# Patient Record
Sex: MALE | Race: WHITE | NOT HISPANIC OR LATINO | Employment: OTHER | URBAN - METROPOLITAN AREA
[De-identification: names, ages, dates, MRNs, and addresses within clinical notes are randomized per-mention and may not be internally consistent; named-entity substitution may affect disease eponyms.]

---

## 2017-02-20 ENCOUNTER — COMMUNICATION - HEALTHEAST (OUTPATIENT)
Dept: FAMILY MEDICINE | Facility: CLINIC | Age: 72
End: 2017-02-20

## 2017-02-20 DIAGNOSIS — I10 ESSENTIAL HYPERTENSION: ICD-10-CM

## 2017-11-20 ENCOUNTER — AMBULATORY - HEALTHEAST (OUTPATIENT)
Dept: FAMILY MEDICINE | Facility: CLINIC | Age: 72
End: 2017-11-20

## 2017-11-20 DIAGNOSIS — I10 ESSENTIAL HYPERTENSION: ICD-10-CM

## 2017-11-20 DIAGNOSIS — E78.5 HYPERLIPIDEMIA: ICD-10-CM

## 2018-05-20 ENCOUNTER — COMMUNICATION - HEALTHEAST (OUTPATIENT)
Dept: FAMILY MEDICINE | Facility: CLINIC | Age: 73
End: 2018-05-20

## 2018-05-20 DIAGNOSIS — E78.5 HYPERLIPIDEMIA: ICD-10-CM

## 2018-05-20 DIAGNOSIS — I10 ESSENTIAL HYPERTENSION: ICD-10-CM

## 2018-08-19 ENCOUNTER — COMMUNICATION - HEALTHEAST (OUTPATIENT)
Dept: FAMILY MEDICINE | Facility: CLINIC | Age: 73
End: 2018-08-19

## 2018-08-19 DIAGNOSIS — E78.5 HYPERLIPIDEMIA: ICD-10-CM

## 2018-08-19 DIAGNOSIS — I10 ESSENTIAL HYPERTENSION: ICD-10-CM

## 2018-09-10 ENCOUNTER — RECORDS - HEALTHEAST (OUTPATIENT)
Dept: ADMINISTRATIVE | Facility: OTHER | Age: 73
End: 2018-09-10

## 2018-09-10 ENCOUNTER — OFFICE VISIT - HEALTHEAST (OUTPATIENT)
Dept: FAMILY MEDICINE | Facility: CLINIC | Age: 73
End: 2018-09-10

## 2018-09-10 DIAGNOSIS — Z00.00 MEDICARE ANNUAL WELLNESS VISIT, SUBSEQUENT: ICD-10-CM

## 2018-09-10 DIAGNOSIS — E78.5 HYPERLIPIDEMIA: ICD-10-CM

## 2018-09-10 DIAGNOSIS — I10 ESSENTIAL HYPERTENSION: ICD-10-CM

## 2018-09-10 DIAGNOSIS — Z12.11 SCREEN FOR COLON CANCER: ICD-10-CM

## 2018-09-10 LAB
ALBUMIN SERPL-MCNC: 4.1 G/DL (ref 3.5–5)
ALP SERPL-CCNC: 50 U/L (ref 45–120)
ALT SERPL W P-5'-P-CCNC: 15 U/L (ref 0–45)
ANION GAP SERPL CALCULATED.3IONS-SCNC: 8 MMOL/L (ref 5–18)
AST SERPL W P-5'-P-CCNC: 13 U/L (ref 0–40)
BILIRUB SERPL-MCNC: 1.1 MG/DL (ref 0–1)
BUN SERPL-MCNC: 11 MG/DL (ref 8–28)
CALCIUM SERPL-MCNC: 9.6 MG/DL (ref 8.5–10.5)
CHLORIDE BLD-SCNC: 106 MMOL/L (ref 98–107)
CHOLEST SERPL-MCNC: 155 MG/DL
CK SERPL-CCNC: 100 U/L (ref 30–190)
CO2 SERPL-SCNC: 28 MMOL/L (ref 22–31)
CREAT SERPL-MCNC: 0.96 MG/DL (ref 0.7–1.3)
ERYTHROCYTE [DISTWIDTH] IN BLOOD BY AUTOMATED COUNT: 11.2 % (ref 11–14.5)
FASTING STATUS PATIENT QL REPORTED: YES
GFR SERPL CREATININE-BSD FRML MDRD: >60 ML/MIN/1.73M2
GLUCOSE BLD-MCNC: 98 MG/DL (ref 70–125)
HCT VFR BLD AUTO: 45.5 % (ref 40–54)
HDLC SERPL-MCNC: 40 MG/DL
HGB BLD-MCNC: 15.3 G/DL (ref 14–18)
LDLC SERPL CALC-MCNC: 93 MG/DL
MCH RBC QN AUTO: 30.9 PG (ref 27–34)
MCHC RBC AUTO-ENTMCNC: 33.7 G/DL (ref 32–36)
MCV RBC AUTO: 92 FL (ref 80–100)
PLATELET # BLD AUTO: 194 THOU/UL (ref 140–440)
PMV BLD AUTO: 7.4 FL (ref 7–10)
POTASSIUM BLD-SCNC: 4.8 MMOL/L (ref 3.5–5)
PROT SERPL-MCNC: 7.8 G/DL (ref 6–8)
PSA SERPL-MCNC: 0.7 NG/ML (ref 0–6.5)
RBC # BLD AUTO: 4.96 MILL/UL (ref 4.4–6.2)
SODIUM SERPL-SCNC: 142 MMOL/L (ref 136–145)
TRIGL SERPL-MCNC: 111 MG/DL
TSH SERPL DL<=0.005 MIU/L-ACNC: 1.38 UIU/ML (ref 0.3–5)
WBC: 7.3 THOU/UL (ref 4–11)

## 2018-09-10 ASSESSMENT — MIFFLIN-ST. JEOR: SCORE: 1661.58

## 2018-09-17 ENCOUNTER — RECORDS - HEALTHEAST (OUTPATIENT)
Dept: ADMINISTRATIVE | Facility: OTHER | Age: 73
End: 2018-09-17

## 2018-10-16 ENCOUNTER — OFFICE VISIT - HEALTHEAST (OUTPATIENT)
Dept: FAMILY MEDICINE | Facility: CLINIC | Age: 73
End: 2018-10-16

## 2018-10-16 DIAGNOSIS — E78.5 HYPERLIPIDEMIA: ICD-10-CM

## 2018-10-16 DIAGNOSIS — Z01.818 PREOP GENERAL PHYSICAL EXAM: ICD-10-CM

## 2018-10-16 DIAGNOSIS — H26.9 BILATERAL CATARACTS: ICD-10-CM

## 2018-10-16 DIAGNOSIS — I10 ESSENTIAL HYPERTENSION: ICD-10-CM

## 2018-10-16 LAB
ATRIAL RATE - MUSE: 60 BPM
DIASTOLIC BLOOD PRESSURE - MUSE: NORMAL MMHG
INTERPRETATION ECG - MUSE: NORMAL
P AXIS - MUSE: 26 DEGREES
POTASSIUM BLD-SCNC: 4.8 MMOL/L (ref 3.5–5)
PR INTERVAL - MUSE: 148 MS
QRS DURATION - MUSE: 96 MS
QT - MUSE: 454 MS
QTC - MUSE: 503 MS
R AXIS - MUSE: 36 DEGREES
SYSTOLIC BLOOD PRESSURE - MUSE: NORMAL MMHG
T AXIS - MUSE: 46 DEGREES
VENTRICULAR RATE- MUSE: 74 BPM

## 2018-10-16 ASSESSMENT — MIFFLIN-ST. JEOR: SCORE: 1670.66

## 2018-11-08 ENCOUNTER — COMMUNICATION - HEALTHEAST (OUTPATIENT)
Dept: FAMILY MEDICINE | Facility: CLINIC | Age: 73
End: 2018-11-08

## 2019-03-26 ENCOUNTER — OFFICE VISIT - HEALTHEAST (OUTPATIENT)
Dept: FAMILY MEDICINE | Facility: CLINIC | Age: 74
End: 2019-03-26

## 2019-03-26 DIAGNOSIS — K40.90 RIGHT INGUINAL HERNIA: ICD-10-CM

## 2019-03-26 DIAGNOSIS — K59.00 CONSTIPATION, UNSPECIFIED CONSTIPATION TYPE: ICD-10-CM

## 2019-03-27 ENCOUNTER — OFFICE VISIT - HEALTHEAST (OUTPATIENT)
Dept: SURGERY | Facility: CLINIC | Age: 74
End: 2019-03-27

## 2019-03-27 DIAGNOSIS — K40.40: ICD-10-CM

## 2019-03-27 ASSESSMENT — MIFFLIN-ST. JEOR: SCORE: 1677.46

## 2019-04-15 ENCOUNTER — OFFICE VISIT - HEALTHEAST (OUTPATIENT)
Dept: FAMILY MEDICINE | Facility: CLINIC | Age: 74
End: 2019-04-15

## 2019-04-15 DIAGNOSIS — I10 ESSENTIAL HYPERTENSION: ICD-10-CM

## 2019-04-15 DIAGNOSIS — K40.90 RIGHT INGUINAL HERNIA: ICD-10-CM

## 2019-04-15 DIAGNOSIS — Z01.818 PREOP GENERAL PHYSICAL EXAM: ICD-10-CM

## 2019-04-15 DIAGNOSIS — E78.5 HYPERLIPIDEMIA, UNSPECIFIED HYPERLIPIDEMIA TYPE: ICD-10-CM

## 2019-04-15 LAB
ANION GAP SERPL CALCULATED.3IONS-SCNC: 8 MMOL/L (ref 5–18)
ATRIAL RATE - MUSE: 54 BPM
BUN SERPL-MCNC: 16 MG/DL (ref 8–28)
CALCIUM SERPL-MCNC: 9.6 MG/DL (ref 8.5–10.5)
CHLORIDE BLD-SCNC: 106 MMOL/L (ref 98–107)
CO2 SERPL-SCNC: 29 MMOL/L (ref 22–31)
CREAT SERPL-MCNC: 0.93 MG/DL (ref 0.7–1.3)
DIASTOLIC BLOOD PRESSURE - MUSE: NORMAL MMHG
ERYTHROCYTE [DISTWIDTH] IN BLOOD BY AUTOMATED COUNT: 11.1 % (ref 11–14.5)
GFR SERPL CREATININE-BSD FRML MDRD: >60 ML/MIN/1.73M2
GLUCOSE BLD-MCNC: 87 MG/DL (ref 70–125)
HCT VFR BLD AUTO: 46.7 % (ref 40–54)
HGB BLD-MCNC: 16.1 G/DL (ref 14–18)
INTERPRETATION ECG - MUSE: NORMAL
MCH RBC QN AUTO: 31.8 PG (ref 27–34)
MCHC RBC AUTO-ENTMCNC: 34.6 G/DL (ref 32–36)
MCV RBC AUTO: 92 FL (ref 80–100)
P AXIS - MUSE: 44 DEGREES
PLATELET # BLD AUTO: 205 THOU/UL (ref 140–440)
PMV BLD AUTO: 7.3 FL (ref 7–10)
POTASSIUM BLD-SCNC: 4.7 MMOL/L (ref 3.5–5)
PR INTERVAL - MUSE: 156 MS
QRS DURATION - MUSE: 98 MS
QT - MUSE: 474 MS
QTC - MUSE: 449 MS
R AXIS - MUSE: 50 DEGREES
RBC # BLD AUTO: 5.08 MILL/UL (ref 4.4–6.2)
SODIUM SERPL-SCNC: 143 MMOL/L (ref 136–145)
SYSTOLIC BLOOD PRESSURE - MUSE: NORMAL MMHG
T AXIS - MUSE: 59 DEGREES
VENTRICULAR RATE- MUSE: 54 BPM
WBC: 6.2 THOU/UL (ref 4–11)

## 2019-04-15 ASSESSMENT — MIFFLIN-ST. JEOR: SCORE: 1672.93

## 2019-04-23 ASSESSMENT — MIFFLIN-ST. JEOR: SCORE: 1654.78

## 2019-04-24 ENCOUNTER — ANESTHESIA - HEALTHEAST (OUTPATIENT)
Dept: SURGERY | Facility: AMBULATORY SURGERY CENTER | Age: 74
End: 2019-04-24

## 2019-04-25 ENCOUNTER — SURGERY - HEALTHEAST (OUTPATIENT)
Dept: SURGERY | Facility: AMBULATORY SURGERY CENTER | Age: 74
End: 2019-04-25

## 2019-09-15 ENCOUNTER — COMMUNICATION - HEALTHEAST (OUTPATIENT)
Dept: FAMILY MEDICINE | Facility: CLINIC | Age: 74
End: 2019-09-15

## 2019-09-15 DIAGNOSIS — E78.5 HYPERLIPIDEMIA: ICD-10-CM

## 2019-09-15 DIAGNOSIS — I10 ESSENTIAL HYPERTENSION: ICD-10-CM

## 2019-10-04 ENCOUNTER — OFFICE VISIT - HEALTHEAST (OUTPATIENT)
Dept: FAMILY MEDICINE | Facility: CLINIC | Age: 74
End: 2019-10-04

## 2019-10-04 DIAGNOSIS — Z00.00 MEDICARE ANNUAL WELLNESS VISIT, INITIAL: ICD-10-CM

## 2019-10-04 DIAGNOSIS — I10 ESSENTIAL HYPERTENSION: ICD-10-CM

## 2019-10-04 DIAGNOSIS — E78.5 HYPERLIPIDEMIA, UNSPECIFIED HYPERLIPIDEMIA TYPE: ICD-10-CM

## 2019-10-04 DIAGNOSIS — R35.1 NOCTURIA: ICD-10-CM

## 2019-10-04 LAB
ALBUMIN SERPL-MCNC: 4.2 G/DL (ref 3.5–5)
ALP SERPL-CCNC: 57 U/L (ref 45–120)
ALT SERPL W P-5'-P-CCNC: 11 U/L (ref 0–45)
ANION GAP SERPL CALCULATED.3IONS-SCNC: 7 MMOL/L (ref 5–18)
AST SERPL W P-5'-P-CCNC: 12 U/L (ref 0–40)
BILIRUB SERPL-MCNC: 0.7 MG/DL (ref 0–1)
BUN SERPL-MCNC: 15 MG/DL (ref 8–28)
CALCIUM SERPL-MCNC: 9.6 MG/DL (ref 8.5–10.5)
CHLORIDE BLD-SCNC: 104 MMOL/L (ref 98–107)
CHOLEST SERPL-MCNC: 157 MG/DL
CO2 SERPL-SCNC: 30 MMOL/L (ref 22–31)
CREAT SERPL-MCNC: 1.07 MG/DL (ref 0.7–1.3)
ERYTHROCYTE [DISTWIDTH] IN BLOOD BY AUTOMATED COUNT: 11.3 % (ref 11–14.5)
FASTING STATUS PATIENT QL REPORTED: YES
GFR SERPL CREATININE-BSD FRML MDRD: >60 ML/MIN/1.73M2
GLUCOSE BLD-MCNC: 102 MG/DL (ref 70–125)
HCT VFR BLD AUTO: 44.1 % (ref 40–54)
HDLC SERPL-MCNC: 38 MG/DL
HGB BLD-MCNC: 15 G/DL (ref 14–18)
LDLC SERPL CALC-MCNC: 85 MG/DL
MCH RBC QN AUTO: 30.8 PG (ref 27–34)
MCHC RBC AUTO-ENTMCNC: 34 G/DL (ref 32–36)
MCV RBC AUTO: 90 FL (ref 80–100)
PLATELET # BLD AUTO: 207 THOU/UL (ref 140–440)
PMV BLD AUTO: 7.2 FL (ref 7–10)
POTASSIUM BLD-SCNC: 4.3 MMOL/L (ref 3.5–5)
PROT SERPL-MCNC: 7.5 G/DL (ref 6–8)
PSA SERPL-MCNC: 0.5 NG/ML (ref 0–6.5)
RBC # BLD AUTO: 4.88 MILL/UL (ref 4.4–6.2)
SODIUM SERPL-SCNC: 141 MMOL/L (ref 136–145)
TRIGL SERPL-MCNC: 170 MG/DL
WBC: 6.6 THOU/UL (ref 4–11)

## 2019-10-04 ASSESSMENT — MIFFLIN-ST. JEOR: SCORE: 1666.12

## 2019-10-07 ENCOUNTER — COMMUNICATION - HEALTHEAST (OUTPATIENT)
Dept: FAMILY MEDICINE | Facility: CLINIC | Age: 74
End: 2019-10-07

## 2019-10-07 LAB — HCV AB SERPL QL IA: NEGATIVE

## 2020-03-09 ENCOUNTER — COMMUNICATION - HEALTHEAST (OUTPATIENT)
Dept: FAMILY MEDICINE | Facility: CLINIC | Age: 75
End: 2020-03-09

## 2020-03-09 DIAGNOSIS — E78.5 HYPERLIPIDEMIA: ICD-10-CM

## 2020-03-09 DIAGNOSIS — I10 ESSENTIAL HYPERTENSION: ICD-10-CM

## 2020-06-11 ENCOUNTER — COMMUNICATION - HEALTHEAST (OUTPATIENT)
Dept: FAMILY MEDICINE | Facility: CLINIC | Age: 75
End: 2020-06-11

## 2020-06-11 DIAGNOSIS — I10 ESSENTIAL HYPERTENSION: ICD-10-CM

## 2020-06-11 DIAGNOSIS — E78.5 HYPERLIPIDEMIA: ICD-10-CM

## 2020-08-18 ENCOUNTER — COMMUNICATION - HEALTHEAST (OUTPATIENT)
Dept: FAMILY MEDICINE | Facility: CLINIC | Age: 75
End: 2020-08-18

## 2020-09-16 ENCOUNTER — OFFICE VISIT - HEALTHEAST (OUTPATIENT)
Dept: FAMILY MEDICINE | Facility: CLINIC | Age: 75
End: 2020-09-16

## 2020-09-16 DIAGNOSIS — I10 ESSENTIAL HYPERTENSION: ICD-10-CM

## 2020-09-16 DIAGNOSIS — E78.5 HYPERLIPIDEMIA: ICD-10-CM

## 2020-09-16 DIAGNOSIS — Z00.00 HEALTHCARE MAINTENANCE: ICD-10-CM

## 2020-09-16 LAB
ALT SERPL W P-5'-P-CCNC: 16 U/L (ref 0–45)
ANION GAP SERPL CALCULATED.3IONS-SCNC: 8 MMOL/L (ref 5–18)
BUN SERPL-MCNC: 13 MG/DL (ref 8–28)
CALCIUM SERPL-MCNC: 9.7 MG/DL (ref 8.5–10.5)
CHLORIDE BLD-SCNC: 103 MMOL/L (ref 98–107)
CHOLEST SERPL-MCNC: 167 MG/DL
CO2 SERPL-SCNC: 29 MMOL/L (ref 22–31)
CREAT SERPL-MCNC: 1.13 MG/DL (ref 0.7–1.3)
FASTING STATUS PATIENT QL REPORTED: YES
GFR SERPL CREATININE-BSD FRML MDRD: >60 ML/MIN/1.73M2
GLUCOSE BLD-MCNC: 102 MG/DL (ref 70–125)
HDLC SERPL-MCNC: 43 MG/DL
LDLC SERPL CALC-MCNC: 104 MG/DL
POTASSIUM BLD-SCNC: 5.4 MMOL/L (ref 3.5–5)
SODIUM SERPL-SCNC: 140 MMOL/L (ref 136–145)
TRIGL SERPL-MCNC: 98 MG/DL

## 2020-09-16 ASSESSMENT — MIFFLIN-ST. JEOR: SCORE: 1666.12

## 2020-09-16 NOTE — ASSESSMENT & PLAN NOTE
Hypertension is well controlled; 128/80 on second reading  BP Readings from Last 3 Encounters:   09/16/20 128/80   10/04/19 120/80   04/25/19 140/76       Current antihypertensives Lotrel 10/20, metoprolol XL 50  Plan: No change, check BMP  Follow-up: 1 year  Results for orders placed or performed in visit on 04/15/19   Basic Metabolic Panel   Result Value Ref Range    Sodium 143 136 - 145 mmol/L    Potassium 4.7 3.5 - 5.0 mmol/L    Chloride 106 98 - 107 mmol/L    CO2 29 22 - 31 mmol/L    Anion Gap, Calculation 8 5 - 18 mmol/L    Glucose 87 70 - 125 mg/dL    Calcium 9.6 8.5 - 10.5 mg/dL    BUN 16 8 - 28 mg/dL    Creatinine 0.93 0.70 - 1.30 mg/dL    GFR MDRD Af Amer >60 >60 mL/min/1.73m2    GFR MDRD Non Af Amer >60 >60 mL/min/1.73m2

## 2020-09-16 NOTE — ASSESSMENT & PLAN NOTE
Healthcare maintenance assessment  Immunization-declines all immunizations, strongly encouraged flu shot  Cancer screening-Cologuashaina 2018, due next year but will be 76 years old, PSA every 2 years, done last year  Vascular-blood pressure good, cholesterol good, regular exercise  Other-has copy of advance directives, briefly reviewed it with him.

## 2020-09-16 NOTE — ASSESSMENT & PLAN NOTE
Simvastatin 20, check lipid and ALT today.  Discussed possibility of stopping lipid-lowering medication around 75 for primary prevention, asked him to speak with his PCP next year and discuss pros and cons.

## 2020-09-17 ENCOUNTER — COMMUNICATION - HEALTHEAST (OUTPATIENT)
Dept: FAMILY MEDICINE | Facility: CLINIC | Age: 75
End: 2020-09-17

## 2021-03-10 ENCOUNTER — AMBULATORY - HEALTHEAST (OUTPATIENT)
Dept: NURSING | Facility: CLINIC | Age: 76
End: 2021-03-10

## 2021-03-31 ENCOUNTER — AMBULATORY - HEALTHEAST (OUTPATIENT)
Dept: NURSING | Facility: CLINIC | Age: 76
End: 2021-03-31

## 2021-05-27 NOTE — PROGRESS NOTES
I was asked to consult on this pt by Ayo Daniels MD for evaluation a hernia.    HPI:  This is a 74 y.o. male here today with concerns of pain and bulging in his right groin. He has noted this for the past 4 week(s). The discomfort he is experiencing is a deep gnawing pain that is worse toward the end of the day.  He notes that the swelling goes away when he lays down.      Allergies:Patient has no known allergies.    No past medical history on file.    No past surgical history on file.   Cateracts Procedure    CURRENT MEDS:  Current Outpatient Medications   Medication Sig Dispense Refill     amLODIPine-benazepril (LOTREL) 10-20 mg per capsule Take 1 capsule by mouth daily. 90 capsule 3     aspirin 81 mg chewable tablet Chew 81 mg daily.       COD LIVER OIL ORAL Take 1 tablet by mouth daily.       metoprolol succinate (TOPROL-XL) 50 MG 24 hr tablet Take 1 tablet (50 mg total) by mouth daily. 90 tablet 3     multivitamin with minerals tablet Take 1 tablet by mouth daily.       simvastatin (ZOCOR) 20 MG tablet Take 1 tablet (20 mg total) by mouth at bedtime. 90 tablet 3     No current facility-administered medications for this visit.        No family history on file.     reports that he has quit smoking. he has never used smokeless tobacco. He reports that he drinks alcohol.    Review of Systems -   10 point Review of systems is negative except for; as mentioned above in HPI and PMHx    There were no vitals taken for this visit.  There is no height or weight on file to calculate BMI.    EXAM:  GENERAL: Well developed male  HEENT: EOMI, Anicteric Sclera  NECK:  No masses, good flexion and extention of the neck  CARDIAC: RRR w/out murmur   CHEST/LUNG: Clear  ABDOMEN: right inguinal hernia. I do not feel a hernia on the Left.  GENITAL: Both testicles descended without masses  NEURO: He is ambulatory with good strength in both legs.    IMAGES: none    Assessment/Plan: Pt with a right inguinal hernia. I discussed this  at length with him.  I went over conservative management as well as surgical treatment for hernias.   I would reccomend a laparoscopic inguinal hernia repair, understanding the possibility of converting to an open operation.   I went over the small risks of surgery including but not limited to bleeding and infection. I discussed the expected recovery time as well. We will schedule this hernia repair at his earliest convenience.    Brendan Mtz MD  [facility]  [phone]

## 2021-05-27 NOTE — PROGRESS NOTES
Preoperative Exam    Scheduled Procedure: Inguinal hernia repair  Surgery Date:  4/25/19  Surgery Location: [facility], fax [phone]    Surgeon:  Dr. Mtz    Assessment/Plan:     1. Preop general physical exam  Electrocardiogram Perform - Clinic    HM2(CBC w/o Differential)   2. Right inguinal hernia     3. Hypertension  Basic Metabolic Panel   4. Hyperlipidemia, unspecified hyperlipidemia type         Surgical Procedure Risk: Low (reported cardiac risk generally < 1%)  Have you had prior anesthesia?: Yes  Have you or any family members had a previous anesthesia reaction:  No  Do you or any family members have a history of a clotting or bleeding disorder?: No  Cardiac Risk Assessment: no increased risk for major cardiac complications    Patient approved for surgery with general or local anesthesia.        Functional Status: Independent  Patient plans to recover at home with a friend     Subjective:      Dwayne Shelton is a 74 y.o. male who presents for a preoperative consultation.  Patient has a 2-3-month history of low bulge and discomfort in the right groin found to have an inguinal hernia.    Plan for laparoscopic surgery.    Patient's past surgeries have been unremarkable only cataract and YAG laser.    He does have hypertension which is controlled.  He will stop his aspirin 5 days prior to the procedure.    No additional concerns or issues    10 point review of systems reviewed and are negative, other than those listed in the HPI.    Pertinent History  Do you have difficulty breathing or chest pain after walking up a flight of stairs: No  History of obstructive sleep apnea: No  Steroid use in the last 6 months: No  Frequent Aspirin/NSAID use: Yes: Pt uses aspirin 81 mg daily, will hold 5 days prior to the procedure  Prior Blood Transfusion: No  Prior Blood Transfusion Reaction: No  If for some reason prior to, during or after the procedure, if it is medically indicated, would you be  willing to have a blood transfusion?:  There is no transfusion refusal.    Current Outpatient Medications   Medication Sig Dispense Refill     amLODIPine-benazepril (LOTREL) 10-20 mg per capsule Take 1 capsule by mouth daily. 90 capsule 3     metoprolol succinate (TOPROL-XL) 50 MG 24 hr tablet Take 1 tablet (50 mg total) by mouth daily. 90 tablet 3     simvastatin (ZOCOR) 20 MG tablet Take 1 tablet (20 mg total) by mouth at bedtime. 90 tablet 3     aspirin 81 mg chewable tablet Chew 81 mg daily.       COD LIVER OIL ORAL Take 1 tablet by mouth daily.       multivitamin with minerals tablet Take 1 tablet by mouth daily.       No current facility-administered medications for this visit.         No Known Allergies    Patient Active Problem List   Diagnosis     Hypertension     Hyperlipidemia     Cerumen Impaction     Hernia, inguinal       Past surgical history: Cataract bilaterally also YAG laser bilaterally.    No family or personal history of anesthesia or bleeding problems.          Social History     Socioeconomic History     Marital status: Single     Spouse name: Not on file     Number of children: Not on file     Years of education: Not on file     Highest education level: Not on file   Occupational History     Not on file   Social Needs     Financial resource strain: Not on file     Food insecurity:     Worry: Not on file     Inability: Not on file     Transportation needs:     Medical: Not on file     Non-medical: Not on file   Tobacco Use     Smoking status: Former Smoker     Smokeless tobacco: Never Used   Substance and Sexual Activity     Alcohol use: Yes     Comment: once per week     Drug use: Not on file     Sexual activity: Not on file   Lifestyle     Physical activity:     Days per week: Not on file     Minutes per session: Not on file     Stress: Not on file   Relationships     Social connections:     Talks on phone: Not on file     Gets together: Not on file     Attends Druze service: Not on file  "    Active member of club or organization: Not on file     Attends meetings of clubs or organizations: Not on file     Relationship status: Not on file     Intimate partner violence:     Fear of current or ex partner: Not on file     Emotionally abused: Not on file     Physically abused: Not on file     Forced sexual activity: Not on file   Other Topics Concern     Not on file   Social History Narrative     Not on file             Objective:     Vitals:    04/15/19 0904   BP: 134/82   Pulse: (!) 52   Resp: 12   SpO2: 96%   Weight: 189 lb (85.7 kg)   Height: 6' 3\" (1.905 m)         Physical Exam:  General appearance no acute distress    HEENT neck is negative oropharynx is clear pupils react normally extraocular movements full he has bilateral intraocular lenses.    Lungs were clear no rales or rhonchi heart was regular S1-S2 rate 55-60.    Heart was regular S1-S2    O2 sat look good at 96%    Abdomen soft bowel sounds normal no guarding or rebound    Inguinal hernia on the right, left is normal    Extremities without edema pulses are full.    No rashes in through the skin.    EKG: EKG reviewed and agree with reading below, no acute changes, QT interval has shortened  Normal sinus rhythm      Labs: BMP is pending and will be faxed, normal CBC      Recent Results (from the past 24 hour(s))   HM2(CBC w/o Differential)    Collection Time: 04/15/19  9:19 AM   Result Value Ref Range    WBC 6.2 4.0 - 11.0 thou/uL    RBC 5.08 4.40 - 6.20 mill/uL    Hemoglobin 16.1 14.0 - 18.0 g/dL    Hematocrit 46.7 40.0 - 54.0 %    MCV 92 80 - 100 fL    MCH 31.8 27.0 - 34.0 pg    MCHC 34.6 32.0 - 36.0 g/dL    RDW 11.1 11.0 - 14.5 %    Platelets 205 140 - 440 thou/uL    MPV 7.3 7.0 - 10.0 fL   Electrocardiogram Perform - Clinic    Collection Time: 04/15/19  9:46 AM   Result Value Ref Range    SYSTOLIC BLOOD PRESSURE  mmHg    DIASTOLIC BLOOD PRESSURE  mmHg    VENTRICULAR RATE 54 BPM    ATRIAL RATE 54 BPM    P-R INTERVAL 156 ms    QRS " DURATION 98 ms    Q-T INTERVAL 474 ms    QTC CALCULATION (BEZET) 449 ms    P Axis 44 degrees    R AXIS 50 degrees    T AXIS 59 degrees    MUSE DIAGNOSIS       Sinus bradycardia  Otherwise normal ECG  When compared with ECG of 16-OCT-2018 10:56,  Premature ventricular complexes are no longer Present  QT has shortened           There is no immunization history on file for this patient.        Electronically signed by Ayo Daniels MD 04/15/19 9:06 AM

## 2021-05-27 NOTE — PROGRESS NOTES
Hernia education & surgery packet provided to pt.    Sheyla De La Fuente CMA ([facility])

## 2021-05-27 NOTE — PROGRESS NOTES
Subjective: Patient comes in for evaluation this 74-year-old has discomfort in through the right inguinal area he has some discomfort especially when he is constipated.  He is been straining more    Denies any specific injury but has noticed a bulge there.    He did not want any immunizations updated    He continues on his blood pressure medicine and that looks good.  Takes amlodipine benazepril as well as metoprolol.    Tobacco status: He  reports that he has quit smoking. he has never used smokeless tobacco.    Patient Active Problem List    Diagnosis Date Noted     Hernia, inguinal 03/28/2019     Hypertension      Hyperlipidemia      Cerumen Impaction        Current Outpatient Medications   Medication Sig Dispense Refill     amLODIPine-benazepril (LOTREL) 10-20 mg per capsule Take 1 capsule by mouth daily. 90 capsule 3     aspirin 81 mg chewable tablet Chew 81 mg daily.       COD LIVER OIL ORAL Take 1 tablet by mouth daily.       metoprolol succinate (TOPROL-XL) 50 MG 24 hr tablet Take 1 tablet (50 mg total) by mouth daily. 90 tablet 3     multivitamin with minerals tablet Take 1 tablet by mouth daily.       simvastatin (ZOCOR) 20 MG tablet Take 1 tablet (20 mg total) by mouth at bedtime. 90 tablet 3     No current facility-administered medications for this visit.        ROS: 10 point review of systems positive as outlined otherwise negative    Objective:    /76 (Patient Site: Left Arm, Patient Position: Sitting, Cuff Size: Adult Regular)   Pulse 64   Resp 12   Wt 190 lb (86.2 kg)   BMI 24.39 kg/m    Body mass index is 24.39 kg/m .      General appearance no acute distress    Lungs are clear no rales or rhonchi heart regular S1-S2 rate in the 60s    Abdomen is soft bowel sounds normal no guarding or rebound    Genital exam showed a right inguinal hernia, testes otherwise unremarkable    Extremities without edema.        Assessment:  1. Right inguinal hernia  Ambulatory referral to General Surgery   2.  Constipation, unspecified constipation type       Right inguinal hernia see general surgery    Constipation increase fruits vegetables water and also discussed using MiraLAX as needed    Plan:.  He is in favor of getting this repaired, await consultation    This transcription uses voice recognition software, which may contain typographical errors.

## 2021-05-28 NOTE — ANESTHESIA CARE TRANSFER NOTE
Last vitals:   Vitals:    04/25/19 1423   BP: (!) 170/99   Pulse: (!) 109   Resp: 16   Temp: 36.5  C (97.7  F)   SpO2: 96%     Pt brought to PACU on 10L facemask. Monitors applied. VSS upon arrival.    Patient's level of consciousness is drowsy  Spontaneous respirations: yes  Maintains airway independently: yes  Dentition unchanged: yes  Oropharynx: oropharynx clear of all foreign objects    QCDR Measures:  ASA# 20 - Surgical Safety Checklist: WHO surgical safety checklist completed prior to induction    PQRS# 430 - Adult PONV Prevention: 4558F - Pt received => 2 anti-emetic agents (different classes) preop & intraop  ASA# 8 - Peds PONV Prevention: NA - Not pediatric patient, not GA or 2 or more risk factors NOT present  PQRS# 424 - Keshia-op Temp Management: 4559F - At least one body temp DOCUMENTED => 35.5C or 95.9F within required timeframe  PQRS# 426 - PACU Transfer Protocol: - Transfer of care checklist used  ASA# 14 - Acute Post-op Pain: ASA14B - Patient did NOT experience pain >= 7 out of 10

## 2021-05-28 NOTE — ANESTHESIA POSTPROCEDURE EVALUATION
Patient: Dwayne Shelton  REPAIR, HERNIA, INGUINAL, LAPAROSCOPIC  Anesthesia type: general    Patient location: Phase II Recovery  Last vitals:   Vitals Value Taken Time   /79 4/25/2019  3:30 PM   Temp 36.5  C (97.7  F) 4/25/2019  3:04 PM   Pulse 77 4/25/2019  3:38 PM   Resp 16 4/25/2019  3:04 PM   SpO2 93 % 4/25/2019  3:38 PM   Vitals shown include unvalidated device data.  Post vital signs: stable  Level of consciousness: awake and responds to simple questions  Post-anesthesia pain: pain controlled  Post-anesthesia nausea and vomiting: no  Pulmonary: unassisted, return to baseline  Cardiovascular: stable and blood pressure at baseline  Hydration: adequate  Anesthetic events: no    QCDR Measures:  ASA# 11 - Keshia-op Cardiac Arrest: ASA11B - Patient did NOT experience unanticipated cardiac arrest  ASA# 12 - Keshia-op Mortality Rate: ASA12B - Patient did NOT die  ASA# 13 - PACU Re-Intubation Rate: ASA13B - Patient did NOT require a new airway mgmt  ASA# 10 - Composite Anes Safety: ASA10A - No serious adverse event    Additional Notes:

## 2021-05-31 ENCOUNTER — RECORDS - HEALTHEAST (OUTPATIENT)
Dept: ADMINISTRATIVE | Facility: CLINIC | Age: 76
End: 2021-05-31

## 2021-06-01 VITALS — HEIGHT: 74 IN | BODY MASS INDEX: 24.38 KG/M2 | WEIGHT: 190 LBS

## 2021-06-01 VITALS — WEIGHT: 192 LBS | BODY MASS INDEX: 24.64 KG/M2 | HEIGHT: 74 IN

## 2021-06-01 NOTE — TELEPHONE ENCOUNTER
Refill Approved    Rx renewed per Medication Renewal Policy. Medication was last renewed on 9/10/18, last OV 4/15/19.    Lili Donato, [facility] Triage/Med Refill 9/15/2019     Requested Prescriptions   Pending Prescriptions Disp Refills     simvastatin (ZOCOR) 20 MG tablet [Pharmacy Med Name: Simvastatin Oral Tablet 20 MG] 90 tablet 2     Sig: Take 1 tablet (20 mg total) by mouth at bedtime.       Statins Refill Protocol (Hmg CoA Reductase Inhibitors) Passed - 9/15/2019  9:30 AM        Passed - PCP or prescribing provider visit in past 12 months      Last office visit with prescriber/PCP: 3/26/2019 Ayo Daniels MD OR same dept: 3/26/2019 Ayo Daniels MD OR same specialty: 3/26/2019 Ayo Daniels MD  Last physical: 4/15/2019 Last MTM visit: Visit date not found   Next visit within 3 mo: Visit date not found  Next physical within 3 mo: Visit date not found  Prescriber OR PCP: Ayo Daniels MD  Last diagnosis associated with med order: 1. Hyperlipidemia  - simvastatin (ZOCOR) 20 MG tablet [Pharmacy Med Name: Simvastatin Oral Tablet 20 MG]; Take 1 tablet (20 mg total) by mouth at bedtime.  Dispense: 90 tablet; Refill: 2    2. Essential hypertension  - metoprolol succinate (TOPROL-XL) 50 MG 24 hr tablet [Pharmacy Med Name: Metoprolol Succinate ER Oral Tablet Extended Release 24 Hour 50 MG]; Take 1 tablet (50 mg total) by mouth daily.  Dispense: 90 tablet; Refill: 2  - amLODIPine-benazepril (LOTREL) 10-20 mg per capsule [Pharmacy Med Name: amLODIPine Besy-Benazepril HCl Oral Capsule 10-20 MG]; Take 1 capsule by mouth daily.  Dispense: 90 capsule; Refill: 2    If protocol passes may refill for 12 months if within 3 months of last provider visit (or a total of 15 months).             metoprolol succinate (TOPROL-XL) 50 MG 24 hr tablet [Pharmacy Med Name: Metoprolol Succinate ER Oral Tablet Extended Release 24 Hour 50 MG] 90 tablet 2     Sig: Take 1 tablet (50 mg total) by mouth daily.        Beta-Blockers Refill Protocol Passed - 9/15/2019  9:30 AM        Passed - PCP or prescribing provider visit in past 12 months or next 3 months     Last office visit with prescriber/PCP: 3/26/2019 Ayo Daniels MD OR same dept: 3/26/2019 Ayo Daniels MD OR same specialty: 3/26/2019 Ayo Daniels MD  Last physical: 4/15/2019 Last MTM visit: Visit date not found   Next visit within 3 mo: Visit date not found  Next physical within 3 mo: Visit date not found  Prescriber OR PCP: Ayo Daniels MD  Last diagnosis associated with med order: 1. Hyperlipidemia  - simvastatin (ZOCOR) 20 MG tablet [Pharmacy Med Name: Simvastatin Oral Tablet 20 MG]; Take 1 tablet (20 mg total) by mouth at bedtime.  Dispense: 90 tablet; Refill: 2    2. Essential hypertension  - metoprolol succinate (TOPROL-XL) 50 MG 24 hr tablet [Pharmacy Med Name: Metoprolol Succinate ER Oral Tablet Extended Release 24 Hour 50 MG]; Take 1 tablet (50 mg total) by mouth daily.  Dispense: 90 tablet; Refill: 2  - amLODIPine-benazepril (LOTREL) 10-20 mg per capsule [Pharmacy Med Name: amLODIPine Besy-Benazepril HCl Oral Capsule 10-20 MG]; Take 1 capsule by mouth daily.  Dispense: 90 capsule; Refill: 2    If protocol passes may refill for 12 months if within 3 months of last provider visit (or a total of 15 months).             Passed - Blood pressure filed in past 12 months     BP Readings from Last 1 Encounters:   04/25/19 140/76             amLODIPine-benazepril (LOTREL) 10-20 mg per capsule [Pharmacy Med Name: amLODIPine Besy-Benazepril HCl Oral Capsule 10-20 MG] 90 capsule 2     Sig: Take 1 capsule by mouth daily.       Ace Inhibitors Refill Protocol Passed - 9/15/2019  9:30 AM        Passed - PCP or prescribing provider visit in past 12 months       Last office visit with prescriber/PCP: 3/26/2019 Ayo Daniels MD OR same dept: 3/26/2019 Ayo Daniels MD OR same specialty: 3/26/2019 Ayo Daniels MD  Last physical: 4/15/2019 Last MTM  visit: Visit date not found   Next visit within 3 mo: Visit date not found  Next physical within 3 mo: Visit date not found  Prescriber OR PCP: Ayo Daniels MD  Last diagnosis associated with med order: 1. Hyperlipidemia  - simvastatin (ZOCOR) 20 MG tablet [Pharmacy Med Name: Simvastatin Oral Tablet 20 MG]; Take 1 tablet (20 mg total) by mouth at bedtime.  Dispense: 90 tablet; Refill: 2    2. Essential hypertension  - metoprolol succinate (TOPROL-XL) 50 MG 24 hr tablet [Pharmacy Med Name: Metoprolol Succinate ER Oral Tablet Extended Release 24 Hour 50 MG]; Take 1 tablet (50 mg total) by mouth daily.  Dispense: 90 tablet; Refill: 2  - amLODIPine-benazepril (LOTREL) 10-20 mg per capsule [Pharmacy Med Name: amLODIPine Besy-Benazepril HCl Oral Capsule 10-20 MG]; Take 1 capsule by mouth daily.  Dispense: 90 capsule; Refill: 2    If protocol passes may refill for 12 months if within 3 months of last provider visit (or a total of 15 months).             Passed - Serum Potassium in past 12 months     Lab Results   Component Value Date    Potassium 4.7 04/15/2019             Passed - Blood pressure filed in past 12 months     BP Readings from Last 1 Encounters:   04/25/19 140/76             Passed - Serum Creatinine in past 12 months     Creatinine   Date Value Ref Range Status   04/15/2019 0.93 0.70 - 1.30 mg/dL Final

## 2021-06-02 ENCOUNTER — COMMUNICATION - HEALTHEAST (OUTPATIENT)
Dept: FAMILY MEDICINE | Facility: CLINIC | Age: 76
End: 2021-06-02

## 2021-06-02 VITALS — BODY MASS INDEX: 24.39 KG/M2 | WEIGHT: 190 LBS

## 2021-06-02 VITALS — WEIGHT: 185 LBS | HEIGHT: 75 IN | BODY MASS INDEX: 23 KG/M2

## 2021-06-02 VITALS — BODY MASS INDEX: 23.5 KG/M2 | HEIGHT: 75 IN | WEIGHT: 189 LBS

## 2021-06-02 VITALS — BODY MASS INDEX: 23.62 KG/M2 | WEIGHT: 190 LBS | HEIGHT: 75 IN

## 2021-06-02 DIAGNOSIS — E78.5 HYPERLIPIDEMIA: ICD-10-CM

## 2021-06-02 DIAGNOSIS — I10 ESSENTIAL HYPERTENSION: ICD-10-CM

## 2021-06-02 NOTE — TELEPHONE ENCOUNTER
----- Message from Ayo Daniels MD sent at 10/7/2019 12:53 PM CDT -----  Please contact this patient, let them know that all the blood tests were normal, the prostate look good the hepatitis C was negative.    Normal liver kidney electrolytes and blood sugar.    Cholesterol was 157 with a good cholesterol 38 and the bad cholesterol 85.    Try to increase physical activity to increase the good cholesterol a little higher.    The patient also wants a copy of these results mailed to his house

## 2021-06-02 NOTE — TELEPHONE ENCOUNTER
Called and spoke with Pt , Message was given, Pt understood, no further questions.  Letter Created and sent to pt's home.

## 2021-06-02 NOTE — PROGRESS NOTES
Subjective: Patient comes in for annual wellness visit.  This patient has hypertension hyperlipidemia.  He said previous TURP he gets up once or twice at night    Does have a skin tag in the scalp he may follow-up for that it gets caught sometimes with haircuts and when he is combing his hair.    Please see the annual wellness visit health risk assessment.  His health is good he needs to exercise a little more and eat more fruits and vegetables.    No concerns regarding help at home he has not had any falls he does have a living will.    His height and weight are appropriate.    He had a Cologuard in 9/2018.    Patient does not want any immunizations.    We will let him know the results of the blood work and also send a copy of the results.    Other physicians he sees the ophthalmologist Dr. Roa otherwise no other physicians.    Meds include the blood pressure meds of metoprolol as well as amlodipine/benazepril combination blood pressure looks good he does take simvastatin for lipid control.    No additional concerns or issues    Tobacco status: He  reports that he has quit smoking. He has never used smokeless tobacco.    Patient Active Problem List    Diagnosis Date Noted     Hernia, inguinal 03/28/2019     Hypertension      Hyperlipidemia      Cerumen Impaction        Current Outpatient Medications   Medication Sig Dispense Refill     amLODIPine-benazepril (LOTREL) 10-20 mg per capsule Take 1 capsule by mouth daily. 90 capsule 1     aspirin 81 mg chewable tablet Chew 81 mg daily.       COD LIVER OIL ORAL Take 1 tablet by mouth daily.       metoprolol succinate (TOPROL-XL) 50 MG 24 hr tablet Take 1 tablet (50 mg total) by mouth daily. 90 tablet 1     multivitamin with minerals tablet Take 1 tablet by mouth daily.       simvastatin (ZOCOR) 20 MG tablet Take 1 tablet (20 mg total) by mouth at bedtime. 90 tablet 1     No current facility-administered medications for this visit.        ROS:   10 point review of  "systems positive as discussed above otherwise negative    Objective:    /80 (Patient Site: Left Arm, Patient Position: Sitting, Cuff Size: Adult Large)   Pulse 62   Temp 97.8  F (36.6  C) (Oral)   Resp 12   Ht 6' 2\" (1.88 m)   Wt 191 lb (86.6 kg)   BMI 24.52 kg/m    Body mass index is 24.52 kg/m .      General appearance no acute distress    Occipital area with skin tag.    Oropharynx is clear pupils react normally extract movements full.    Lungs are clear no rales or rhonchi heart was regular S1-S2, no murmur    Skin was normal no rashes.  Other than the skin tag is noted    Abdomen soft nontender no guarding or rebound no axillary inguinal adenopathy    Testes descended normally no evidence of hernia    Rectal was done status post TURP no significant regrowth    Lower extremities without edema pulses normal    No joint redness warmth or swelling.    Labs triglycerides a little elevated 170 L CMP was normal    CBC was normal    PSA was normal, hepatitis C is pending screening        Results for orders placed or performed in visit on 10/04/19   Comprehensive Metabolic Panel   Result Value Ref Range    Sodium 141 136 - 145 mmol/L    Potassium 4.3 3.5 - 5.0 mmol/L    Chloride 104 98 - 107 mmol/L    CO2 30 22 - 31 mmol/L    Anion Gap, Calculation 7 5 - 18 mmol/L    Glucose 102 70 - 125 mg/dL    BUN 15 8 - 28 mg/dL    Creatinine 1.07 0.70 - 1.30 mg/dL    GFR MDRD Af Amer >60 >60 mL/min/1.73m2    GFR MDRD Non Af Amer >60 >60 mL/min/1.73m2    Bilirubin, Total 0.7 0.0 - 1.0 mg/dL    Calcium 9.6 8.5 - 10.5 mg/dL    Protein, Total 7.5 6.0 - 8.0 g/dL    Albumin 4.2 3.5 - 5.0 g/dL    Alkaline Phosphatase 57 45 - 120 U/L    AST 12 0 - 40 U/L    ALT 11 0 - 45 U/L   Lipid Cascade FASTING   Result Value Ref Range    Cholesterol 157 <=199 mg/dL    Triglycerides 170 (H) <=149 mg/dL    HDL Cholesterol 38 (L) >=40 mg/dL    LDL Calculated 85 <=129 mg/dL    Patient Fasting > 8hrs? Yes    HM2(CBC w/o Differential)   Result " Value Ref Range    WBC 6.6 4.0 - 11.0 thou/uL    RBC 4.88 4.40 - 6.20 mill/uL    Hemoglobin 15.0 14.0 - 18.0 g/dL    Hematocrit 44.1 40.0 - 54.0 %    MCV 90 80 - 100 fL    MCH 30.8 27.0 - 34.0 pg    MCHC 34.0 32.0 - 36.0 g/dL    RDW 11.3 11.0 - 14.5 %    Platelets 207 140 - 440 thou/uL    MPV 7.2 7.0 - 10.0 fL   PSA (Prostatic-Specific Antigen), Diagnostic   Result Value Ref Range    PSA 0.5 0.0 - 6.5 ng/mL       Assessment:  1. Medicare annual wellness visit, initial  HM2(CBC w/o Differential)    Hepatitis C Antibody (Anti-HCV)   2. Hypertension  Comprehensive Metabolic Panel   3. Hyperlipidemia, unspecified hyperlipidemia type  Comprehensive Metabolic Panel    Lipid Cascade FASTING   4. Nocturia  PSA (Prostatic-Specific Antigen), Diagnostic     Medicare annual wellness visit stable    Hypertension controlled continue on metoprolol and amlodipine with benazepril    Hyperlipidemia on simvastatin LDL looked okay triglycerides a little elevated nonfasting.  Will work on carbohydrates    Nocturia with previous TURP exam and PSA normal    Skin tag posterior scalp follow-up if persisting issues and wants to get it removed.    Screening hep C check    Patient has living well no additional concerns.    Refusal for immunizations.    Up-to-date on colon cancer screening with Cologuashaina -9/2018 recheck that in 2 years    Plan: As discussed above    This transcription uses voice recognition software, which may contain typographical errors.

## 2021-06-02 NOTE — PATIENT INSTRUCTIONS - HE
Continue follow-up with your ophthalmologist    You are due for [place] in 2 years.    He will be contacted regarding the results of the blood work    Continue on same meds for blood pressure and cholesterol.    Follow-up if you like the skin tag removed.

## 2021-06-03 VITALS
BODY MASS INDEX: 24.51 KG/M2 | HEIGHT: 74 IN | RESPIRATION RATE: 12 BRPM | DIASTOLIC BLOOD PRESSURE: 80 MMHG | WEIGHT: 191 LBS | SYSTOLIC BLOOD PRESSURE: 120 MMHG | HEART RATE: 62 BPM | TEMPERATURE: 97.8 F

## 2021-06-03 RX ORDER — AMLODIPINE AND BENAZEPRIL HYDROCHLORIDE 10; 20 MG/1; MG/1
CAPSULE ORAL
Qty: 90 CAPSULE | Refills: 1 | Status: SHIPPED | OUTPATIENT
Start: 2021-06-03 | End: 2021-12-06

## 2021-06-03 RX ORDER — METOPROLOL SUCCINATE 50 MG/1
TABLET, EXTENDED RELEASE ORAL
Qty: 90 TABLET | Refills: 1 | Status: SHIPPED | OUTPATIENT
Start: 2021-06-03 | End: 2021-12-06

## 2021-06-03 RX ORDER — SIMVASTATIN 20 MG
20 TABLET ORAL AT BEDTIME
Qty: 90 TABLET | Refills: 1 | Status: SHIPPED | OUTPATIENT
Start: 2021-06-03 | End: 2021-12-07

## 2021-06-04 VITALS
RESPIRATION RATE: 14 BRPM | WEIGHT: 191 LBS | SYSTOLIC BLOOD PRESSURE: 128 MMHG | DIASTOLIC BLOOD PRESSURE: 80 MMHG | BODY MASS INDEX: 24.51 KG/M2 | HEIGHT: 74 IN | HEART RATE: 59 BPM | TEMPERATURE: 97.9 F

## 2021-06-08 NOTE — TELEPHONE ENCOUNTER
Last visit: 03/26/2019  , Medication refill requested. Please authorize medication if appropriate. Thank you.

## 2021-06-10 NOTE — TELEPHONE ENCOUNTER
----- Message from Moy Pearson CMA sent at 6/10/2020  8:17 AM CDT -----      ----- Message -----  From: Marlo Granados MD  Sent: 6/9/2020   1:11 PM CDT  To: [facility]/Ob Support Pool    Please set up Annual Wellness visit virtual visit.

## 2021-06-10 NOTE — TELEPHONE ENCOUNTER
Patient stated he only wants to see Dr. Daniels unable to do video calls. Offer AWV with other providers in clinic. Patient stated he will think about it and call us back. No further questions.

## 2021-06-16 PROBLEM — Z00.00 HEALTHCARE MAINTENANCE: Status: ACTIVE | Noted: 2020-09-16

## 2021-06-16 PROBLEM — K40.90 HERNIA, INGUINAL: Status: ACTIVE | Noted: 2019-03-28

## 2021-06-18 NOTE — PATIENT INSTRUCTIONS - HE
Patient Instructions by Fede Charles MD at 9/16/2020  8:00 AM     Author: Fede Charles MD Service: -- Author Type: Physician    Filed: 9/16/2020  8:41 AM Encounter Date: 9/16/2020 Status: Signed    : Feed Charles MD (Physician)         Patient Education   Understanding USDA MyPlate  The USDA (US Department of Agriculture) has guidelines to help you make healthy food choices. These are called MyPlate. MyPlate shows the food groups that make up healthy meals using the image of a place setting. Before you eat, think about the healthiest choices for what to put onto your plate or into your cup or bowl. To learn more about building a healthy plate, visit www.choosemyplate.gov.       The Food Groups    Fruits: Any fruit or 100% fruit juice counts as part of the Fruit Group. Fruits may be fresh, canned, frozen, or dried, and may be whole, cut-up, or pureed. Make half your plate fruits and vegetables.    Vegetables: Any vegetable or 100% vegetable juice counts as a member of the Vegetable Group. Vegetables may be fresh, frozen, canned, or dried. They can be served raw or cooked and may be whole, cut-up, or mashed. Make half your plate fruits and vegetables.     Grains: All foods made from grains are part of the Grains Group. These include wheat, rice, oats, cornmeal, and barley such as bread, pasta, oatmeal, cereal, tortillas, and grits. Grains should be no more than a quarter of your plate. At least half of your grains should be whole grains.    Protein: This group includes meat, poultry, seafood, beans and peas, eggs, processed soy products (like tofu), nuts (including nut butters), and seeds. Make protein choices no more than a quarter of your plate. Meat and poultry choices should be lean or low fat.    Dairy: All fluid milk products and foods made from milk that contain calcium, like yogurt and cheese are part of the Dairy Group. (Foods that have little calcium, such as cream,  butter, and cream cheese, are not part of the group.) Most dairy choices should be low-fat or fat-free.    Oils: These are fats that are liquid at room temperature. They include canola, corn, olive, soybean, and sunflower oil. Foods that are mainly oil include mayonnaise, certain salad dressings, and soft margarines. You should have only 5 to 7 teaspoons of oils a day. You probably already get this much from the food you eat.  Use [employer] to Help Build Your Meals  The [employer] can help you plan and track your meals and activity. You can look up individual foods to see or compare their nutritional value. You can get guidelines for what and how much you should eat. You can compare your food choices. And you can assess personal physical activities and see ways you can improve. Go to www.PetHub.gov/[employer]/.    4763-3626 [employer]. [address] [place] [address]. All rights reserved. This information is not intended as a substitute for professional medical care. Always follow your healthcare professional's instructions.             Advance Directive  Patients advance directive was discussed and I am comfortable with the patients wishes.  Patient Education   Personalized Prevention Plan  You are due for the preventive services outlined below.  Your care team is available to assist you in scheduling these services.  If you have already completed any of these items, please share that information with your care team to update in your medical record.  Health Maintenance   Topic Date Due   ? TD 18+ HE  01/16/1963   ? ZOSTER VACCINES (1 of 2) 01/16/1995   ? PNEUMOCOCCAL IMMUNIZATION 65+ LOW/MEDIUM RISK (1 of 2 - PCV13) 01/16/2010   ? INFLUENZA VACCINE RULE BASED (1) 08/01/2020   ? FALL RISK ASSESSMENT  10/04/2020   ? MEDICARE ANNUAL WELLNESS VISIT  09/16/2021   ? ADVANCE CARE PLANNING  10/15/2023   ? LIPID  10/04/2024   ? COLORECTAL CANCER SCREENING  09/17/2028   ? HEPATITIS C  SCREENING  Completed   ? HEPATITIS B VACCINES  Aged Out

## 2021-06-19 NOTE — LETTER
Letter by Ayo Daniels MD at      Author: Ayo Daniels MD Service: -- Author Type: --    Filed:  Encounter Date: 10/7/2019 Status: Signed         Dwayne Shelton  [address] [facility] [address]             October 8, 2019         Dear Mr. Shelton,    Below are the results from your recent visit:    Resulted Orders   Comprehensive Metabolic Panel   Result Value Ref Range    Sodium 141 136 - 145 mmol/L    Potassium 4.3 3.5 - 5.0 mmol/L    Chloride 104 98 - 107 mmol/L    CO2 30 22 - 31 mmol/L    Anion Gap, Calculation 7 5 - 18 mmol/L    Glucose 102 70 - 125 mg/dL    BUN 15 8 - 28 mg/dL    Creatinine 1.07 0.70 - 1.30 mg/dL    GFR MDRD Af Amer >60 >60 mL/min/1.73m2    GFR MDRD Non Af Amer >60 >60 mL/min/1.73m2    Bilirubin, Total 0.7 0.0 - 1.0 mg/dL    Calcium 9.6 8.5 - 10.5 mg/dL    Protein, Total 7.5 6.0 - 8.0 g/dL    Albumin 4.2 3.5 - 5.0 g/dL    Alkaline Phosphatase 57 45 - 120 U/L    AST 12 0 - 40 U/L    ALT 11 0 - 45 U/L    Narrative    Fasting Glucose reference range is 70-99 mg/dL per  American Diabetes Association (ADA) guidelines.   Lipid Andrew FASTING   Result Value Ref Range    Cholesterol 157 <=199 mg/dL    Triglycerides 170 (H) <=149 mg/dL    HDL Cholesterol 38 (L) >=40 mg/dL    LDL Calculated 85 <=129 mg/dL    Patient Fasting > 8hrs? Yes    HM2(CBC w/o Differential)   Result Value Ref Range    WBC 6.6 4.0 - 11.0 thou/uL    RBC 4.88 4.40 - 6.20 mill/uL    Hemoglobin 15.0 14.0 - 18.0 g/dL    Hematocrit 44.1 40.0 - 54.0 %    MCV 90 80 - 100 fL    MCH 30.8 27.0 - 34.0 pg    MCHC 34.0 32.0 - 36.0 g/dL    RDW 11.3 11.0 - 14.5 %    Platelets 207 140 - 440 thou/uL    MPV 7.2 7.0 - 10.0 fL   PSA (Prostatic-Specific Antigen), Diagnostic   Result Value Ref Range    PSA 0.5 0.0 - 6.5 ng/mL    Narrative    Method is Abbott Prostate-Specific Antigen (PSA)  Standard-WHO 1st International (90:10)   Hepatitis C Antibody (Anti-HCV)   Result Value Ref Range    Hepatitis C Ab Negative Negative     The blood  tests were normal, the prostate look good the hepatitis C was negative.    Normal liver kidney electrolytes and blood sugar.    Cholesterol was 157 with a good cholesterol 38 and the bad cholesterol 85.    Try to increase physical activity to increase the good cholesterol a little higher.    Please call with questions or contact us using [employer].    Sincerely,        Electronically signed by Ayo Daniels MD

## 2021-06-20 NOTE — PROGRESS NOTES
Subjective: Patient comes in for an annual wellness visit.  He is 73 years old    Patient refuses immunizations.    Other physicians the patient sees he sees an ophthalmologist he cannot remember who, also had seen Dr. Yuan the hand surgeon.  His hand seems to be doing fine that was about 3-4 years ago    Continues on amlodipine/benazepril 10/21 a day as well as simvastatin 20 mg 1 a day and metoprolol 50 mg a day.  No other medicines he has not been in for 20 months.    He has had no myalgias symptoms I did check CMP CBC lipid and TSH PSA CPK    Also he has not had a colonoscopy but did agree to get [place] guard on    His hearing is fine with his hearing aids his vision is off a little and has cataracts he is to be following up with the eye doctor he states.    Please see the flowsheet regarding the annual wellness visit health risk assessment.  He needs to eat more fruits and vegetables.  Also we discussed advanced directives/living well and I gave him a form that he can review.    No additional concerns or issues    He has had previous TURP denies any urinary symptoms now.    Please see the flowsheet for additional normal details.    Tobacco status: He  reports that he has quit smoking. He has never used smokeless tobacco.    Patient Active Problem List    Diagnosis Date Noted     Hypertension      Hyperlipidemia      Cerumen Impaction        Current Outpatient Prescriptions   Medication Sig Dispense Refill     aspirin 81 mg chewable tablet Chew 81 mg daily.       COD LIVER OIL ORAL Take 1 tablet by mouth daily.       metoprolol succinate (TOPROL-XL) 50 MG 24 hr tablet Take 1 tablet (50 mg total) by mouth daily. 90 tablet 3     simvastatin (ZOCOR) 20 MG tablet Take 1 tablet (20 mg total) by mouth at bedtime. 90 tablet 3     amLODIPine-benazepril (LOTREL) 10-20 mg per capsule Take 1 capsule by mouth daily. 90 capsule 3     multivitamin with minerals tablet Take 1 tablet by mouth daily.       No current  "facility-administered medications for this visit.        ROS:   10 point review of systems negative other than as outlined above    Objective:    /80 (Patient Site: Left Arm, Patient Position: Sitting, Cuff Size: Adult Large)  Pulse (!) 55  Temp 97.4  F (36.3  C) (Oral)   Resp 14  Ht 6' 2\" (1.88 m)  Wt 190 lb (86.2 kg)  SpO2 91%  BMI 24.39 kg/m2  Body mass index is 24.39 kg/(m^2).      General appearance no acute distress    HEENT canals and TMs normal oropharynx is clear pupils react normally    Decreased vision 10/40 on the right 10/32 on the left    Neck without adenopathy or bruit    Lungs are clear no rales or rhonchi heart was regular rate in the 60 range O2 sat 91% temp 97.4    Genitalia normal descended testes no evidence of hernia    Rectal was done status post TUR no significant regrowth    Skin is normal no rashes.    No joint redness warmth or swelling.    Get up and go test was normal.    Labs with normal CMP normal CBC    Lipids look good    PSA 0.7, TSH and CPK normal        Results for orders placed or performed in visit on 09/10/18   Comprehensive Metabolic Panel   Result Value Ref Range    Sodium 142 136 - 145 mmol/L    Potassium 4.8 3.5 - 5.0 mmol/L    Chloride 106 98 - 107 mmol/L    CO2 28 22 - 31 mmol/L    Anion Gap, Calculation 8 5 - 18 mmol/L    Glucose 98 70 - 125 mg/dL    BUN 11 8 - 28 mg/dL    Creatinine 0.96 0.70 - 1.30 mg/dL    GFR MDRD Af Amer >60 >60 mL/min/1.73m2    GFR MDRD Non Af Amer >60 >60 mL/min/1.73m2    Bilirubin, Total 1.1 (H) 0.0 - 1.0 mg/dL    Calcium 9.6 8.5 - 10.5 mg/dL    Protein, Total 7.8 6.0 - 8.0 g/dL    Albumin 4.1 3.5 - 5.0 g/dL    Alkaline Phosphatase 50 45 - 120 U/L    AST 13 0 - 40 U/L    ALT 15 0 - 45 U/L   HM2(CBC w/o Differential)   Result Value Ref Range    WBC 7.3 4.0 - 11.0 thou/uL    RBC 4.96 4.40 - 6.20 mill/uL    Hemoglobin 15.3 14.0 - 18.0 g/dL    Hematocrit 45.5 40.0 - 54.0 %    MCV 92 80 - 100 fL    MCH 30.9 27.0 - 34.0 pg    MCHC 33.7 " 32.0 - 36.0 g/dL    RDW 11.2 11.0 - 14.5 %    Platelets 194 140 - 440 thou/uL    MPV 7.4 7.0 - 10.0 fL   Lipid Cascade FASTING   Result Value Ref Range    Cholesterol 155 <=199 mg/dL    Triglycerides 111 <=149 mg/dL    HDL Cholesterol 40 >=40 mg/dL    LDL Calculated 93 <=129 mg/dL    Patient Fasting > 8hrs? Yes    PSA, Annual Screen (Prostatic-Specific Antigen)   Result Value Ref Range    PSA 0.7 0.0 - 6.5 ng/mL   CK Total   Result Value Ref Range    CK, Total 100 30 - 190 U/L   Thyroid Stimulating Hormone (TSH)   Result Value Ref Range    TSH 1.38 0.30 - 5.00 uIU/mL       Assessment:  1. Medicare annual wellness visit, subsequent  Vision Screening    2(CBC w/o Differential)    PSA, Annual Screen (Prostatic-Specific Antigen)    Thyroid Stimulating Hormone (TSH)    Cologuard   2. Screen for colon cancer     3. Hypertension  Comprehensive Metabolic Panel   4. Hyperlipidemia  Comprehensive Metabolic Panel    Lipid Cascade FASTING    CK Total    simvastatin (ZOCOR) 20 MG tablet   5. Essential hypertension  metoprolol succinate (TOPROL-XL) 50 MG 24 hr tablet    amLODIPine-benazepril (LOTREL) 10-20 mg per capsule     Annual wellness visit stable    Screening for colon cancer: He does not want to do colonoscopy but will follow [place] guard    Hypertension controlled on 2 medications as outlined    Simvastatin for cholesterol looks to be controlled no side effects    Cataracts needs to get surgery he will follow up with the eye surgeon    Plan: Discussion on risks, stable no falling issues    Increase fruits and vegetable    Fill out a living will    This transcription uses voice recognition software, which may contain typographical errors.

## 2021-06-20 NOTE — LETTER
Letter by Fede Charles MD at      Author: Fede Charles MD Service: -- Author Type: --    Filed:  Encounter Date: 9/17/2020 Status: (Other)         Dwayne Shelton  [address] [place] [address]             September 17, 2020         Dear Mr. Shelton,    Below are the results from your recent visit:    Resulted Orders   Lipid Cascade   Result Value Ref Range    Cholesterol 167 <=199 mg/dL    Triglycerides 98 <=149 mg/dL    HDL Cholesterol 43 >=40 mg/dL    LDL Calculated 104 <=129 mg/dL    Patient Fasting > 8hrs? Yes    ALT (SGPT)   Result Value Ref Range    ALT 16 0 - 45 U/L   Basic Metabolic Panel   Result Value Ref Range    Sodium 140 136 - 145 mmol/L    Potassium 5.4 (H) 3.5 - 5.0 mmol/L    Chloride 103 98 - 107 mmol/L    CO2 29 22 - 31 mmol/L    Anion Gap, Calculation 8 5 - 18 mmol/L    Glucose 102 70 - 125 mg/dL    Calcium 9.7 8.5 - 10.5 mg/dL    BUN 13 8 - 28 mg/dL    Creatinine 1.13 0.70 - 1.30 mg/dL    GFR MDRD Af Amer >60 >60 mL/min/1.73m2    GFR MDRD Non Af Amer >60 >60 mL/min/1.73m2    Narrative    Fasting Glucose reference range is 70-99 mg/dL per  American Diabetes Association (ADA) guidelines.       Gunner, mildly elevated potassium probably a result of the Lotrel.  I would not change anything at this point but perhaps we should recheck your potassium in about 6 months to make sure this is not part of the trend.  Otherwise cholesterol looks great and the rest of your kidney tests look good today.    Please call with questions or contact us using [employer].    Sincerely,        Electronically signed by Fede Charles MD

## 2021-06-21 NOTE — PROGRESS NOTES
Preoperative Exam    Scheduled Procedure: Cataract   Surgery Date:  11/06/2018, 11/20/2018   Surgery Location: [facility], [place]   fax number [phone]  Surgeon:  Carlos Enrique Roa M.D.    Assessment/Plan:     1. Preop general physical exam     2. Bilateral cataracts     3. Hypertension  Potassium    Electrocardiogram Perform - Clinic   4. Hyperlipidemia         Surgical Procedure Risk: Low (reported cardiac risk generally < 1%)  Have you had prior anesthesia?: Yes  Have you or any family members had a previous anesthesia reaction:  No  Do you or any family members have a history of a clotting or bleeding disorder?: No  Cardiac Risk Assessment: no increased risk for major cardiac complications    Patient approved for surgery with general or local anesthesia.    Addendum 11/12/2018.  This preop is good for both surgeries, 11/6/2018 as well as 11/20/2018        Functional Status: Independent  Patient plans to recover at home with friend     Subjective:      Dwayne Shelton is a 73 y.o. male who presents for a preoperative consultation.  Patient has history of bilateral cataracts.  He states his vision has worsened and will be getting 1 cataract removed on 11/6/2018 and the other on 11/20/2018.    He has hypertension which is controlled hyperlipidemia which is controlled.      10 point review of systems reviewed and are negative, other than those listed in the HPI.    Pertinent History  Do you have difficulty breathing or chest pain after walking up a flight of stairs: No  History of obstructive sleep apnea: No  Steroid use in the last 6 months: No  Frequent Aspirin/NSAID use: Yes: asprin 81 mg daily  Prior Blood Transfusion: No  Prior Blood Transfusion Reaction: No  If for some reason prior to, during or after the procedure, if it is medically indicated, would you be willing to have a blood transfusion?:  There is no transfusion refusal.    Current Outpatient Prescriptions   Medication Sig Dispense Refill  "    amLODIPine-benazepril (LOTREL) 10-20 mg per capsule Take 1 capsule by mouth daily. 90 capsule 3     aspirin 81 mg chewable tablet Chew 81 mg daily.       metoprolol succinate (TOPROL-XL) 50 MG 24 hr tablet Take 1 tablet (50 mg total) by mouth daily. 90 tablet 3     multivitamin with minerals tablet Take 1 tablet by mouth daily.       simvastatin (ZOCOR) 20 MG tablet Take 1 tablet (20 mg total) by mouth at bedtime. 90 tablet 3     COD LIVER OIL ORAL Take 1 tablet by mouth daily.       No current facility-administered medications for this visit.         No Known Allergies    Patient Active Problem List   Diagnosis     Hypertension     Hyperlipidemia     Cerumen Impaction     Past surgical history: Eyelid surgery, TURP, Dupuytren's    No personal or family history for anesthesia or bleeding problems    Social History     Social History     Marital status: Single     Spouse name: N/A     Number of children: N/A     Years of education: N/A     Occupational History     Not on file.     Social History Main Topics     Smoking status: Former Smoker     Smokeless tobacco: Never Used     Alcohol use Yes      Comment: once per week     Drug use: Not on file     Sexual activity: Not on file     Other Topics Concern     Not on file     Social History Narrative             Objective:     Vitals:    10/16/18 1022   BP: 138/86   Pulse: (!) 56   Resp: 12   Temp: 97.3  F (36.3  C)   TempSrc: Oral   SpO2: 96%   Weight: 192 lb (87.1 kg)   Height: 6' 2\" (1.88 m)         Physical Exam:  General appearance no acute distress    HEENT he has bilateral cataract    Oropharynx is clear    Canals and TMs normal    Neck without adenopathy.    Lungs are clear throughout no rales or rhonchi heart regular S1-S2.  No murmur    Abdomen is soft nontender no guarding or rebound    No axillary or inguinal adenopathy    Extremities without edema skin was normal.      EKG: EKG reviewed and agree with reading below, PVC noted.    Labs: Potassium level " is pending and will be faxed  Recent Results (from the past 24 hour(s))   Electrocardiogram Perform - Clinic    Collection Time: 10/16/18 10:56 AM   Result Value Ref Range    SYSTOLIC BLOOD PRESSURE  mmHg    DIASTOLIC BLOOD PRESSURE  mmHg    VENTRICULAR RATE 74 BPM    ATRIAL RATE 60 BPM    P-R INTERVAL 148 ms    QRS DURATION 96 ms    Q-T INTERVAL 454 ms    QTC CALCULATION (BEZET) 503 ms    P Axis 26 degrees    R AXIS 36 degrees    T AXIS 46 degrees    MUSE DIAGNOSIS       Sinus rhythm with occasional Premature ventricular complexes  Prolonged QT  Abnormal ECG  No previous ECGs available           There is no immunization history on file for this patient.        Electronically signed by Ayo Daniels MD 10/16/18 10:25 AM

## 2021-06-25 NOTE — TELEPHONE ENCOUNTER
Refill Approved    Rx renewed per Medication Renewal Policy. Medication was last renewed on 9/16/20.    Fernando Murguia, [facility] Connection Triage/Med Refill 6/3/2021     Requested Prescriptions   Pending Prescriptions Disp Refills     simvastatin (ZOCOR) 20 MG tablet [Pharmacy Med Name: Simvastatin Oral Tablet 20 MG] 90 tablet 0     Sig: Take 1 tablet (20 mg total) by mouth at bedtime.       Statins Refill Protocol (Hmg CoA Reductase Inhibitors) Passed - 6/2/2021  2:01 AM        Passed - PCP or prescribing provider visit in past 12 months      Last office visit with prescriber/PCP: Visit date not found OR same dept: Visit date not found OR same specialty: 3/26/2019 Ayo Daniels MD  Last physical: 9/16/2020 Last MTM visit: Visit date not found   Next visit within 3 mo: Visit date not found  Next physical within 3 mo: Visit date not found  Prescriber OR PCP: Fede Charles MD  Last diagnosis associated with med order: 1. Hyperlipidemia  - simvastatin (ZOCOR) 20 MG tablet [Pharmacy Med Name: Simvastatin Oral Tablet 20 MG]; Take 1 tablet (20 mg total) by mouth at bedtime.  Dispense: 90 tablet; Refill: 0    2. Essential hypertension  - metoprolol succinate (TOPROL-XL) 50 MG 24 hr tablet [Pharmacy Med Name: Metoprolol Succinate ER Oral Tablet Extended Release 24 Hour 50 MG]; TAKE ONE TABLET BY MOUTH ONE TIME DAILY   Dispense: 90 tablet; Refill: 0  - amLODIPine-benazepril (LOTREL) 10-20 mg per capsule [Pharmacy Med Name: amLODIPine Besy-Benazepril HCl Oral Capsule 10-20 MG]; TAKE ONE CAPSULE BY MOUTH ONE TIME DAILY   Dispense: 90 capsule; Refill: 0    If protocol passes may refill for 12 months if within 3 months of last provider visit (or a total of 15 months).                metoprolol succinate (TOPROL-XL) 50 MG 24 hr tablet [Pharmacy Med Name: Metoprolol Succinate ER Oral Tablet Extended Release 24 Hour 50 MG] 90 tablet 0     Sig: TAKE ONE TABLET BY MOUTH ONE TIME DAILY       Beta-Blockers Refill Protocol Passed -  6/2/2021  2:01 AM        Passed - PCP or prescribing provider visit in past 12 months or next 3 months     Last office visit with prescriber/PCP: Visit date not found OR same dept: Visit date not found OR same specialty: 3/26/2019 Ayo Daniels MD  Last physical: 9/16/2020 Last MTM visit: Visit date not found   Next visit within 3 mo: Visit date not found  Next physical within 3 mo: Visit date not found  Prescriber OR PCP: Fede Charles MD  Last diagnosis associated with med order: 1. Hyperlipidemia  - simvastatin (ZOCOR) 20 MG tablet [Pharmacy Med Name: Simvastatin Oral Tablet 20 MG]; Take 1 tablet (20 mg total) by mouth at bedtime.  Dispense: 90 tablet; Refill: 0    2. Essential hypertension  - metoprolol succinate (TOPROL-XL) 50 MG 24 hr tablet [Pharmacy Med Name: Metoprolol Succinate ER Oral Tablet Extended Release 24 Hour 50 MG]; TAKE ONE TABLET BY MOUTH ONE TIME DAILY   Dispense: 90 tablet; Refill: 0  - amLODIPine-benazepril (LOTREL) 10-20 mg per capsule [Pharmacy Med Name: amLODIPine Besy-Benazepril HCl Oral Capsule 10-20 MG]; TAKE ONE CAPSULE BY MOUTH ONE TIME DAILY   Dispense: 90 capsule; Refill: 0    If protocol passes may refill for 12 months if within 3 months of last provider visit (or a total of 15 months).             Passed - Blood pressure filed in past 12 months     BP Readings from Last 1 Encounters:   09/16/20 128/80                amLODIPine-benazepril (LOTREL) 10-20 mg per capsule [Pharmacy Med Name: amLODIPine Besy-Benazepril HCl Oral Capsule 10-20 MG] 90 capsule 0     Sig: TAKE ONE CAPSULE BY MOUTH ONE TIME DAILY       Ace Inhibitors Refill Protocol Passed - 6/2/2021  2:01 AM        Passed - PCP or prescribing provider visit in past 12 months       Last office visit with prescriber/PCP: Visit date not found OR same dept: Visit date not found OR same specialty: 3/26/2019 Ayo Daniels MD  Last physical: 9/16/2020 Last MTM visit: Visit date not found   Next visit within 3 mo: Visit  date not found  Next physical within 3 mo: Visit date not found  Prescriber OR PCP: Fede Charles MD  Last diagnosis associated with med order: 1. Hyperlipidemia  - simvastatin (ZOCOR) 20 MG tablet [Pharmacy Med Name: Simvastatin Oral Tablet 20 MG]; Take 1 tablet (20 mg total) by mouth at bedtime.  Dispense: 90 tablet; Refill: 0    2. Essential hypertension  - metoprolol succinate (TOPROL-XL) 50 MG 24 hr tablet [Pharmacy Med Name: Metoprolol Succinate ER Oral Tablet Extended Release 24 Hour 50 MG]; TAKE ONE TABLET BY MOUTH ONE TIME DAILY   Dispense: 90 tablet; Refill: 0  - amLODIPine-benazepril (LOTREL) 10-20 mg per capsule [Pharmacy Med Name: amLODIPine Besy-Benazepril HCl Oral Capsule 10-20 MG]; TAKE ONE CAPSULE BY MOUTH ONE TIME DAILY   Dispense: 90 capsule; Refill: 0    If protocol passes may refill for 12 months if within 3 months of last provider visit (or a total of 15 months).             Passed - Serum Potassium in past 12 months     Lab Results   Component Value Date    Potassium 5.4 (H) 09/16/2020             Passed - Blood pressure filed in past 12 months     BP Readings from Last 1 Encounters:   09/16/20 128/80             Passed - Serum Creatinine in past 12 months     Creatinine   Date Value Ref Range Status   09/16/2020 1.13 0.70 - 1.30 mg/dL Final            Calcium-Channel Blockers Protocol Passed - 6/2/2021  2:01 AM        Passed - PCP or prescribing provider visit in past 12 months or next 3 months     Last office visit with prescriber/PCP: Visit date not found OR same dept: Visit date not found OR same specialty: 3/26/2019 Ayo Daniels MD  Last physical: 9/16/2020 Last MTM visit: Visit date not found   Next visit within 3 mo: Visit date not found  Next physical within 3 mo: Visit date not found  Prescriber OR PCP: Fede Charles MD  Last diagnosis associated with med order: 1. Hyperlipidemia  - simvastatin (ZOCOR) 20 MG tablet [Pharmacy Med Name: Simvastatin Oral Tablet 20 MG]; Take 1  tablet (20 mg total) by mouth at bedtime.  Dispense: 90 tablet; Refill: 0    2. Essential hypertension  - metoprolol succinate (TOPROL-XL) 50 MG 24 hr tablet [Pharmacy Med Name: Metoprolol Succinate ER Oral Tablet Extended Release 24 Hour 50 MG]; TAKE ONE TABLET BY MOUTH ONE TIME DAILY   Dispense: 90 tablet; Refill: 0  - amLODIPine-benazepril (LOTREL) 10-20 mg per capsule [Pharmacy Med Name: amLODIPine Besy-Benazepril HCl Oral Capsule 10-20 MG]; TAKE ONE CAPSULE BY MOUTH ONE TIME DAILY   Dispense: 90 capsule; Refill: 0    If protocol passes may refill for 12 months if within 3 months of last provider visit (or a total of 15 months).             Passed - Blood pressure filed in past 12 months     BP Readings from Last 1 Encounters:   09/16/20 128/80            Angiotensin Receptor Blocker Protocol Passed - 6/2/2021  2:01 AM        Passed - PCP or prescribing provider visit in past 12 months       Last office visit with prescriber/PCP: Visit date not found OR same dept: Visit date not found OR same specialty: 3/26/2019 Ayo Daniels MD  Last physical: 9/16/2020 Last MTM visit: Visit date not found   Next visit within 3 mo: Visit date not found  Next physical within 3 mo: Visit date not found  Prescriber OR PCP: Fede Charles MD  Last diagnosis associated with med order: 1. Hyperlipidemia  - simvastatin (ZOCOR) 20 MG tablet [Pharmacy Med Name: Simvastatin Oral Tablet 20 MG]; Take 1 tablet (20 mg total) by mouth at bedtime.  Dispense: 90 tablet; Refill: 0    2. Essential hypertension  - metoprolol succinate (TOPROL-XL) 50 MG 24 hr tablet [Pharmacy Med Name: Metoprolol Succinate ER Oral Tablet Extended Release 24 Hour 50 MG]; TAKE ONE TABLET BY MOUTH ONE TIME DAILY   Dispense: 90 tablet; Refill: 0  - amLODIPine-benazepril (LOTREL) 10-20 mg per capsule [Pharmacy Med Name: amLODIPine Besy-Benazepril HCl Oral Capsule 10-20 MG]; TAKE ONE CAPSULE BY MOUTH ONE TIME DAILY   Dispense: 90 capsule; Refill: 0    If protocol  passes may refill for 12 months if within 3 months of last provider visit (or a total of 15 months).             Passed - Serum potassium within the past 12 months     Lab Results   Component Value Date    Potassium 5.4 (H) 09/16/2020             Passed - Blood pressure filed in past 12 months     BP Readings from Last 1 Encounters:   09/16/20 128/80             Passed - Serum creatinine within the past 12 months     Creatinine   Date Value Ref Range Status   09/16/2020 1.13 0.70 - 1.30 mg/dL Final

## 2021-06-26 ENCOUNTER — HEALTH MAINTENANCE LETTER (OUTPATIENT)
Age: 76
End: 2021-06-26

## 2021-07-03 NOTE — ANESTHESIA PREPROCEDURE EVALUATION
Anesthesia Preprocedure Evaluation by Frank Carter MD at 4/25/2019 12:40 PM     Author: Frank Carter MD Service: -- Author Type: Physician    Filed: 4/25/2019 12:41 PM Date of Service: 4/25/2019 12:40 PM Status: Signed    : Frank Carter MD (Physician)       Anesthesia Evaluation      Patient summary reviewed   No history of anesthetic complications     Airway   Mallampati: II   Pulmonary - negative ROS and normal exam                          Cardiovascular - normal exam  Exercise tolerance: > or = 4 METS  (+) hypertension, , hypercholesterolemia,      Neuro/Psych - negative ROS     Endo/Other - negative ROS      GI/Hepatic/Renal - negative ROS           Dental                             Anesthesia Plan  Planned anesthetic: general endotracheal  Versed/fent/prop/jag  Decadron/zofran  ASA 2   Induction: intravenous   Anesthetic plan and risks discussed with: patient and spouse  Anesthesia plan special considerations: antiemetics,   Post-op plan: routine recovery      Results for orders placed or performed in visit on 04/15/19   HM2(CBC w/o Differential)   Result Value Ref Range    WBC 6.2 4.0 - 11.0 thou/uL    RBC 5.08 4.40 - 6.20 mill/uL    Hemoglobin 16.1 14.0 - 18.0 g/dL    Hematocrit 46.7 40.0 - 54.0 %    MCV 92 80 - 100 fL    MCH 31.8 27.0 - 34.0 pg    MCHC 34.6 32.0 - 36.0 g/dL    RDW 11.1 11.0 - 14.5 %    Platelets 205 140 - 440 thou/uL    MPV 7.3 7.0 - 10.0 fL   Basic Metabolic Panel   Result Value Ref Range    Sodium 143 136 - 145 mmol/L    Potassium 4.7 3.5 - 5.0 mmol/L    Chloride 106 98 - 107 mmol/L    CO2 29 22 - 31 mmol/L    Anion Gap, Calculation 8 5 - 18 mmol/L    Glucose 87 70 - 125 mg/dL    Calcium 9.6 8.5 - 10.5 mg/dL    BUN 16 8 - 28 mg/dL    Creatinine 0.93 0.70 - 1.30 mg/dL    GFR MDRD Af Amer >60 >60 mL/min/1.73m2    GFR MDRD Non Af Amer >60 >60 mL/min/1.73m2   Electrocardiogram Perform - Clinic   Result Value Ref Range    SYSTOLIC BLOOD PRESSURE  mmHg    DIASTOLIC BLOOD  PRESSURE  mmHg    VENTRICULAR RATE 54 BPM    ATRIAL RATE 54 BPM    P-R INTERVAL 156 ms    QRS DURATION 98 ms    Q-T INTERVAL 474 ms    QTC CALCULATION (BEZET) 449 ms    P Axis 44 degrees    R AXIS 50 degrees    T AXIS 59 degrees    MUSE DIAGNOSIS       Sinus bradycardia  Otherwise normal ECG  When compared with ECG of 16-OCT-2018 10:56,  Premature ventricular complexes are no longer Present  QT has shortened  Confirmed by ASHWINI LE, NOAH LOC:JN ([id number]) on 4/15/2019 3:07:01 PM

## 2021-07-28 NOTE — PROGRESS NOTES
Progress Notes by Fede Charles MD at 9/16/2020  8:00 AM     Author: Fede Charles MD Service: -- Author Type: Physician    Filed: 7/28/2021  2:03 PM Encounter Date: 9/16/2020 Status: Signed    : Fede Charles MD (Physician)       Assessment and Plan:     Patient has been advised of split billing requirements and indicates understanding: No  Problem List Items Addressed This Visit        Unprioritized    Hypertension     Hypertension is well controlled; 128/80 on second reading  BP Readings from Last 3 Encounters:   09/16/20 128/80   10/04/19 120/80   04/25/19 140/76       Current antihypertensives Lotrel 10/20, metoprolol XL 50  Plan: No change, check BMP  Follow-up: 1 year  Results for orders placed or performed in visit on 04/15/19   Basic Metabolic Panel   Result Value Ref Range    Sodium 143 136 - 145 mmol/L    Potassium 4.7 3.5 - 5.0 mmol/L    Chloride 106 98 - 107 mmol/L    CO2 29 22 - 31 mmol/L    Anion Gap, Calculation 8 5 - 18 mmol/L    Glucose 87 70 - 125 mg/dL    Calcium 9.6 8.5 - 10.5 mg/dL    BUN 16 8 - 28 mg/dL    Creatinine 0.93 0.70 - 1.30 mg/dL    GFR MDRD Af Amer >60 >60 mL/min/1.73m2    GFR MDRD Non Af Amer >60 >60 mL/min/1.73m2                 Relevant Medications    amLODIPine-benazepril (LOTREL) 10-20 mg per capsule    metoprolol succinate (TOPROL-XL) 50 MG 24 hr tablet    Other Relevant Orders    Basic Metabolic Panel    Hyperlipidemia     Simvastatin 20, check lipid and ALT today.  Discussed possibility of stopping lipid-lowering medication around 75 for primary prevention, asked him to speak with his PCP next year and discuss pros and cons.         Relevant Medications    simvastatin (ZOCOR) 20 MG tablet    Other Relevant Orders    Lipid Cascade    ALT (SGPT)    Healthcare maintenance - Primary     Healthcare maintenance assessment  Immunization-declines all immunizations, strongly encouraged flu shot  Cancer screening-Cologuard 2018, due next year but will  be 76 years old, PSA every 2 years, done last year  Vascular-blood pressure good, cholesterol good, regular exercise  Other-has copy of advance directives, briefly reviewed it with him.                     The patient's current medical problems were reviewed.    I have had an Advance Directives discussion with the patient.  The following health maintenance schedule was reviewed with the patient and provided in printed form in the after visit summary:   Health Maintenance   Topic Date Due   ? TD 18+ HE  01/16/1963   ? ZOSTER VACCINES (1 of 2) 01/16/1995   ? PNEUMOCOCCAL IMMUNIZATION 65+ LOW/MEDIUM RISK (1 of 2 - PCV13) 01/16/2010   ? INFLUENZA VACCINE RULE BASED (1) 08/01/2020   ? FALL RISK ASSESSMENT  10/04/2020   ? MEDICARE ANNUAL WELLNESS VISIT  09/16/2021   ? ADVANCE CARE PLANNING  10/15/2023   ? LIPID  10/04/2024   ? COLORECTAL CANCER SCREENING  09/17/2028   ? HEPATITIS C SCREENING  Completed   ? HEPATITIS B VACCINES  Aged Out        Subjective:   Chief Complaint: Dwayne Shelton is an 75 y.o. male here for an Annual Wellness visit.   HPI: Patient here for routine physical exam, no questions  Lives alone, remains active.  Review of Systems:    Please see above.  The rest of the review of systems are negative for all systems.    Patient Care Team:  Ayo Daniels MD as PCP - General  Ayo Daniels MD as Assigned PCP     Patient Active Problem List   Diagnosis   ? Hypertension   ? Hyperlipidemia   ? Cerumen Impaction   ? Hernia, inguinal   ? Healthcare maintenance     Past Medical History:   Diagnosis Date   ? Hypertension       Past Surgical History:   Procedure Laterality Date   ? BLEPHAROPLASTY     ? CATARACT EXTRACTION     ? DUPUYTREN CONTRACTURE RELEASE     ? DC LAP,INGUINAL HERNIA REPR,INITIAL Right 4/25/2019    Procedure: REPAIR, HERNIA, INGUINAL, LAPAROSCOPIC;  Surgeon: Brendan Mtz MD;  Location: [facility];  Service: General   ? PROSTATE SURGERY        No family history on file.    Social History     Socioeconomic History   ? Marital status: Single     Spouse name: Not on file   ? Number of children: Not on file   ? Years of education: Not on file   ? Highest education level: Not on file   Occupational History   ? Not on file   Social Needs   ? Financial resource strain: Not on file   ? Food insecurity     Worry: Not on file     Inability: Not on file   ? Transportation needs     Medical: Not on file     Non-medical: Not on file   Tobacco Use   ? Smoking status: Former Smoker   ? Smokeless tobacco: Never Used   Substance and Sexual Activity   ? Alcohol use: Yes     Comment: once per week   ? Drug use: Never   ? Sexual activity: Not on file   Lifestyle   ? Physical activity     Days per week: Not on file     Minutes per session: Not on file   ? Stress: Not on file   Relationships   ? Social connections     Talks on phone: Not on file     Gets together: Not on file     Attends Baptism service: Not on file     Active member of club or organization: Not on file     Attends meetings of clubs or organizations: Not on file     Relationship status: Not on file   ? Intimate partner violence     Fear of current or ex partner: Not on file     Emotionally abused: Not on file     Physically abused: Not on file     Forced sexual activity: Not on file   Other Topics Concern   ? Not on file   Social History Narrative   ? Not on file      Current Outpatient Medications   Medication Sig Dispense Refill   ? amLODIPine-benazepril (LOTREL) 10-20 mg per capsule Take 1 capsule by mouth daily. 90 capsule 2   ? aspirin 81 mg chewable tablet Chew 81 mg daily.     ? COD LIVER OIL ORAL Take 1 tablet by mouth daily.     ? metoprolol succinate (TOPROL-XL) 50 MG 24 hr tablet Take 1 tablet (50 mg total) by mouth daily. 90 tablet 2   ? multivitamin with minerals tablet Take 1 tablet by mouth daily.     ? simvastatin (ZOCOR) 20 MG tablet Take 1 tablet (20 mg total) by mouth at bedtime. 90 tablet 2     No current  "facility-administered medications for this visit.       Objective:   Vital Signs:   Visit Vitals  /80 (Patient Site: Right Arm, Patient Position: Sitting, Cuff Size: Adult Regular)   Pulse (!) 59   Temp 97.9  F (36.6  C) (Oral)   Resp 14   Ht 6' 2\" (1.88 m)   Wt 191 lb (86.6 kg)   BMI 24.52 kg/m           VisionScreening:  No exam data present     PHYSICAL EXAM  Gen- alert, oriented/ appropriately responsive  HEENT- normal cephalic, atraumatic.   Chest- Normal inspiration and expiration.    Clear to ascultation.    No chest wall deformity or scar.  CV- Heart regular rate and rhythm  normal tones, no murmurs   No gallops or rubs.  Abdomen soft without any tenderness Ext- appear well perfused, no edema  Skin- warm and dry,   no visualized rash  Rectal exam shows very small prostate    Assessment Results 9/16/2020   Activities of Daily Living No help needed   Instrumental Activities of Daily Living No help needed   Mini Cog Total Score 5   Some recent data might be hidden     A Mini-Cog score of 0-2 suggests the possibility of dementia, score of 3-5 suggests no dementia       Identified Health Risks:     This encounter was created in error - please disregard.  This encounter was created in error - please disregard.       "

## 2021-09-21 ENCOUNTER — OFFICE VISIT (OUTPATIENT)
Dept: FAMILY MEDICINE | Facility: CLINIC | Age: 76
End: 2021-09-21
Payer: COMMERCIAL

## 2021-09-21 VITALS
HEIGHT: 73 IN | WEIGHT: 194 LBS | DIASTOLIC BLOOD PRESSURE: 86 MMHG | BODY MASS INDEX: 25.71 KG/M2 | RESPIRATION RATE: 16 BRPM | TEMPERATURE: 98.1 F | HEART RATE: 59 BPM | SYSTOLIC BLOOD PRESSURE: 139 MMHG

## 2021-09-21 DIAGNOSIS — R35.1 NOCTURIA: ICD-10-CM

## 2021-09-21 DIAGNOSIS — Z12.11 COLON CANCER SCREENING: ICD-10-CM

## 2021-09-21 DIAGNOSIS — Z00.00 ENCOUNTER FOR MEDICARE ANNUAL WELLNESS EXAM: Primary | ICD-10-CM

## 2021-09-21 DIAGNOSIS — E78.5 HYPERLIPIDEMIA, UNSPECIFIED HYPERLIPIDEMIA TYPE: ICD-10-CM

## 2021-09-21 DIAGNOSIS — E55.9 VITAMIN D DEFICIENCY: ICD-10-CM

## 2021-09-21 DIAGNOSIS — I10 ESSENTIAL HYPERTENSION: ICD-10-CM

## 2021-09-21 LAB
ALBUMIN SERPL-MCNC: 4 G/DL (ref 3.5–5)
ALP SERPL-CCNC: 51 U/L (ref 45–120)
ALT SERPL W P-5'-P-CCNC: 14 U/L (ref 0–45)
ANION GAP SERPL CALCULATED.3IONS-SCNC: 12 MMOL/L (ref 5–18)
AST SERPL W P-5'-P-CCNC: 15 U/L (ref 0–40)
BILIRUB SERPL-MCNC: 1.5 MG/DL (ref 0–1)
BUN SERPL-MCNC: 11 MG/DL (ref 8–28)
CALCIUM SERPL-MCNC: 9.7 MG/DL (ref 8.5–10.5)
CHLORIDE BLD-SCNC: 104 MMOL/L (ref 98–107)
CHOLEST SERPL-MCNC: 165 MG/DL
CO2 SERPL-SCNC: 26 MMOL/L (ref 22–31)
CREAT SERPL-MCNC: 0.94 MG/DL (ref 0.7–1.3)
ERYTHROCYTE [DISTWIDTH] IN BLOOD BY AUTOMATED COUNT: 12.5 % (ref 10–15)
FASTING STATUS PATIENT QL REPORTED: YES
GFR SERPL CREATININE-BSD FRML MDRD: 78 ML/MIN/1.73M2
GLUCOSE BLD-MCNC: 97 MG/DL (ref 70–125)
HCT VFR BLD AUTO: 44.7 % (ref 40–53)
HDLC SERPL-MCNC: 47 MG/DL
HGB BLD-MCNC: 15.1 G/DL (ref 13.3–17.7)
LDLC SERPL CALC-MCNC: 93 MG/DL
MCH RBC QN AUTO: 31.3 PG (ref 26.5–33)
MCHC RBC AUTO-ENTMCNC: 33.8 G/DL (ref 31.5–36.5)
MCV RBC AUTO: 93 FL (ref 78–100)
PLATELET # BLD AUTO: 200 10E3/UL (ref 150–450)
POTASSIUM BLD-SCNC: 4.8 MMOL/L (ref 3.5–5)
PROT SERPL-MCNC: 7.4 G/DL (ref 6–8)
PSA SERPL-MCNC: 0.79 UG/L (ref 0–6.5)
RBC # BLD AUTO: 4.83 10E6/UL (ref 4.4–5.9)
SODIUM SERPL-SCNC: 142 MMOL/L (ref 136–145)
TRIGL SERPL-MCNC: 124 MG/DL
WBC # BLD AUTO: 6.8 10E3/UL (ref 4–11)

## 2021-09-21 PROCEDURE — 99397 PER PM REEVAL EST PAT 65+ YR: CPT | Performed by: FAMILY MEDICINE

## 2021-09-21 PROCEDURE — 84153 ASSAY OF PSA TOTAL: CPT | Performed by: FAMILY MEDICINE

## 2021-09-21 PROCEDURE — 80053 COMPREHEN METABOLIC PANEL: CPT | Performed by: FAMILY MEDICINE

## 2021-09-21 PROCEDURE — 36415 COLL VENOUS BLD VENIPUNCTURE: CPT | Performed by: FAMILY MEDICINE

## 2021-09-21 PROCEDURE — 82306 VITAMIN D 25 HYDROXY: CPT | Performed by: FAMILY MEDICINE

## 2021-09-21 PROCEDURE — 80061 LIPID PANEL: CPT | Performed by: FAMILY MEDICINE

## 2021-09-21 PROCEDURE — 85027 COMPLETE CBC AUTOMATED: CPT | Performed by: FAMILY MEDICINE

## 2021-09-21 ASSESSMENT — ACTIVITIES OF DAILY LIVING (ADL): CURRENT_FUNCTION: NO ASSISTANCE NEEDED

## 2021-09-21 ASSESSMENT — MIFFLIN-ST. JEOR: SCORE: 1663.86

## 2021-09-21 NOTE — PATIENT INSTRUCTIONS
Patient Education   Personalized Prevention Plan  You are due for the preventive services outlined below.  Your care team is available to assist you in scheduling these services.  If you have already completed any of these items, please share that information with your care team to update in your medical record.  Health Maintenance Due   Topic Date Due     ANNUAL REVIEW OF HM ORDERS  Never done     Diptheria Tetanus Pertussis (DTAP/TDAP/TD) Vaccine (1 - Tdap) Never done     Zoster (Shingles) Vaccine (1 of 2) Never done     Pneumococcal Vaccine (1 of 1 - PPSV23) Never done     Flu Vaccine (1) Never done     FALL RISK ASSESSMENT  09/16/2021

## 2021-09-21 NOTE — PROGRESS NOTES
SUBJECTIVE:   Dwayne Shelton is a 76 year old male who presents for Preventive Visit.    Patient had an annual wellness visit today    He is doing well living independently    Mini cog 5 out of 5    No risk for falling    Exercises, rides his bike daily does yard work.    He is on aspirin chlorhexidine for his gums metoprolol amlodipine/benazepril simvastatin and a multivitamin    Patient had a potassium level 5.4 last September.    Labs today include CBC PSA lipid CMP vitamin D also ordered Cologuard this was done September 2018.    He denies any bowel or bladder issues he has had a previous TURP 2015 he has nocturia x1 now.    No additional concern or issue    He did have the Covid shots x2 but does not want any other immunizations.      Patient has been advised of split billing requirements and indicates understanding: Yes   Are you in the first 12 months of your Medicare coverage?  No    HPI  Do you feel safe in your environment? Yes    Have you ever done Advance Care Planning? (For example, a Health Directive, POLST, or a discussion with a medical provider or your loved ones about your wishes): Yes, advance care planning is on file.    Hearing aid stable  Fall risk  Fallen 2 or more times in the past year?: No  Any fall with injury in the past year?: No    Cognitive Screening   1) Repeat 3 items (Leader, Season, Table)    2) Clock draw: NORMAL  3) 3 item recall: Recalls 3 objects  Results: 3 items recalled: COGNITIVE IMPAIRMENT LESS LIKELY  No concerns  Mini-CogTM Copyright S Cholo. Licensed by the author for use in [facility]; reprinted with permission (nicol@.[email or web address]). All rights reserved.      Do you have sleep apnea, excessive snoring or daytime drowsiness?: no    Reviewed and updated as needed this visit by clinical staff  Tobacco  Allergies  Meds              Reviewed and updated as needed this visit by Provider                Social History     Tobacco Use     Smoking status: Former  "Smoker     Smokeless tobacco: Never Used   Substance Use Topics     Alcohol use: Yes     Comment: Alcoholic Drinks/day: once per week         Alcohol Use 9/21/2021   Prescreen: >3 drinks/day or >7 drinks/week? No         Current providers sharing in care for this patient include:   Patient Care Team:  Ayo Daniels MD as PCP - General (Family Practice)  Ayo Daniels MD as Assigned PCP   [facility]    The following health maintenance items are reviewed in Epic and correct as of today:  Health Maintenance Due   Topic Date Due     ANNUAL REVIEW OF HM ORDERS  Never done     DTAP/TDAP/TD IMMUNIZATION (1 - Tdap) Never done     ZOSTER IMMUNIZATION (1 of 2) Never done     Pneumococcal Vaccine: 65+ Years (1 of 1 - PPSV23) Never done     INFLUENZA VACCINE (1) Never done             Review of Systems  10 point review of systems positive as outlined above otherwise negative    OBJECTIVE:   /86 (BP Location: Left arm, Patient Position: Sitting, Cuff Size: Adult Large)   Pulse 59   Temp 98.1  F (36.7  C) (Temporal)   Resp 16   Ht 1.854 m (6' 1\")   Wt 88 kg (194 lb)   BMI 25.60 kg/m   Estimated body mass index is 25.6 kg/m  as calculated from the following:    Height as of this encounter: 1.854 m (6' 1\").    Weight as of this encounter: 88 kg (194 lb).  Physical Exam  General appearance no acute distress    HEENT he wears hearing aids canals were normal  Neck without adenopathy no bruit  Oropharynx clear    Pupils react normally    Lungs clear no rales or rhonchi    Heart regular S1-S2 no murmur    Abdomen nontender no masses no axillary or inguinal adenopathy    Testes descended normally no evidence of hernia    Rectal was done prostate regrowth mild no irregularities.    Skin is normal no rashes    No peripheral edema.    No joint redness warmth or swelling    Labs CBC PSA lipid CMP vitamin D pending    Cologuard pending          ASSESSMENT / PLAN:       ICD-10-CM    1. Encounter for Medicare annual wellness exam  " "Z00.00    2. Hyperlipidemia, unspecified hyperlipidemia type  E78.5 Comprehensive metabolic panel (BMP + Alb, Alk Phos, ALT, AST, Total. Bili, TP)     Lipid Profile (Chol, Trig, HDL, LDL calc)   3. Essential hypertension  I10 Comprehensive metabolic panel (BMP + Alb, Alk Phos, ALT, AST, Total. Bili, TP)   4. Nocturia  R35.1 PSA, tumor marker     CBC with platelets   5. Vitamin D deficiency  E55.9 Vitamin D Deficiency   6. Colon cancer screening  Z12.11 COLOGUARD(EXACT SCIENCES)     Stable annual wellness exam    No significant risks    Hyperlipidemia will await lipid panel he is on simvastatin    He is not on any vitamin D replacement we will check vitamin D level    Hypertension controlled on metoprolol and amlodipine/benazepril.    Nocturia mild history of TURP exam stable on the prostate please see above    PSA levels pending    Colon cancer screening with Cologuard.    Immunizations: Refuses all immunizations except he did get the COVID-19 vaccinations.      Estimated body mass index is 25.6 kg/m  as calculated from the following:    Height as of this encounter: 1.854 m (6' 1\").    Weight as of this encounter: 88 kg (194 lb).        He reports that he has quit smoking. He has never used smokeless tobacco.      Appropriate preventive services were discussed with this patient, including applicable screening as appropriate for cardiovascular disease, diabetes, osteopenia/osteoporosis, and glaucoma.  As appropriate for age/gender, discussed screening for colorectal cancer, prostate cancer, breast cancer, and cervical cancer. Checklist reviewing preventive services available has been given to the patient.    Reviewed patients plan of care and provided an AVS      Counseling Resources:  ATP IV Guidelines  Pooled Cohorts Equation Calculator  Breast Cancer Risk Calculator  Breast Cancer: Medication to Reduce Risk  FRAX Risk Assessment  ICSI Preventive Guidelines  Dietary Guidelines for Americans, 2010  USDA's " MyPlate  ASA Prophylaxis  Lung CA Screening    Ayo Daniels MD  [facility]    Identified Health Risks:    No risks identified

## 2021-09-21 NOTE — LETTER
September 23, 2021      Dwayne LIBRA [address]  [address] [facility] [address]        Dear ,    We are writing to inform you of your test results.    All the labs look normal the vitamin D was good at 52    The PSA level is nice and low regarding prostate    The liver kidney electrolytes and blood sugar were all normal    The cholesterol level was 165 with a good cholesterol 47 and the bad cholesterol 93, this is in a good range.    The hemoglobin and white count and platelets were normal.    Good news no change in medications recheck in 1 year       Resulted Orders   PSA, tumor marker   Result Value Ref Range    PSA Tumor Marker 0.79 0.00 - 6.50 ug/L   Comprehensive metabolic panel (BMP + Alb, Alk Phos, ALT, AST, Total. Bili, TP)   Result Value Ref Range    Sodium 142 136 - 145 mmol/L    Potassium 4.8 3.5 - 5.0 mmol/L    Chloride 104 98 - 107 mmol/L    Carbon Dioxide (CO2) 26 22 - 31 mmol/L    Anion Gap 12 5 - 18 mmol/L    Urea Nitrogen 11 8 - 28 mg/dL    Creatinine 0.94 0.70 - 1.30 mg/dL    Calcium 9.7 8.5 - 10.5 mg/dL    Glucose 97 70 - 125 mg/dL    Alkaline Phosphatase 51 45 - 120 U/L    AST 15 0 - 40 U/L    ALT 14 0 - 45 U/L    Protein Total 7.4 6.0 - 8.0 g/dL    Albumin 4.0 3.5 - 5.0 g/dL    Bilirubin Total 1.5 (H) 0.0 - 1.0 mg/dL    GFR Estimate 78 >60 mL/min/1.73m2      Comment:      As of July 11, 2021, eGFR is calculated by the CKD-EPI creatinine equation, without race adjustment. eGFR can be influenced by muscle mass, exercise, and diet. The reported eGFR is an estimation only and is only applicable if the renal function is stable.   CBC with platelets   Result Value Ref Range    WBC Count 6.8 4.0 - 11.0 10e3/uL    RBC Count 4.83 4.40 - 5.90 10e6/uL    Hemoglobin 15.1 13.3 - 17.7 g/dL    Hematocrit 44.7 40.0 - 53.0 %    MCV 93 78 - 100 fL    MCH 31.3 26.5 - 33.0 pg    MCHC 33.8 31.5 - 36.5 g/dL    RDW 12.5 10.0 - 15.0 %    Platelet Count 200 150 - 450 10e3/uL   Lipid Profile (Chol, Trig, HDL, LDL  calc)   Result Value Ref Range    Cholesterol 165 <=199 mg/dL    Triglycerides 124 <=149 mg/dL    Direct Measure HDL 47 >=40 mg/dL      Comment:      HDL Cholesterol Reference Range:     0-2 years:   No reference ranges established for patients under 2 years old  at [facility] for lipid analytes.    2-8 years:  Greater than 45 mg/dL     18 years and older:   Female: Greater than or equal to 50 mg/dL   Male:   Greater than or equal to 40 mg/dL    LDL Cholesterol Calculated 93 <=129 mg/dL    Patient Fasting > 8hrs? Yes    Vitamin D Deficiency   Result Value Ref Range    Vitamin D, Total (25-Hydroxy) 52 30 - 80 ug/L    Narrative    Deficiency <10.0 ug/L  Insufficiency 10.0-29.9 ug/L  Sufficiency 30.0-80.0 ug/L  Toxicity (possible) >100.0 ug/L        If you have any questions or concerns, please call the clinic at the number listed above.       Sincerely,      Ayo Daniels MD

## 2021-09-23 ENCOUNTER — TELEPHONE (OUTPATIENT)
Dept: FAMILY MEDICINE | Facility: CLINIC | Age: 76
End: 2021-09-23

## 2021-09-23 LAB — DEPRECATED CALCIDIOL+CALCIFEROL SERPL-MC: 52 UG/L (ref 30–80)

## 2021-09-23 NOTE — TELEPHONE ENCOUNTER
Patient informed, patient requested letter this was sent    ----- Message from Ayo Daniels MD sent at 9/23/2021  1:33 PM CDT -----  Please contact this patient, let him know that all the labs look normal the vitamin D was good at 52The PSA level is nice and low regarding prostateThe liver kidney electrolytes and blood sugar were all normalThe cholesterol level was 165 with a good cholesterol 47 and the bad cholesterol 93, this is in a good range.The hemoglobin and white count and platelets were normal.Good nose no change in medications recheck in 1 year

## 2021-10-07 ENCOUNTER — TRANSFERRED RECORDS (OUTPATIENT)
Dept: HEALTH INFORMATION MANAGEMENT | Facility: CLINIC | Age: 76
End: 2021-10-07

## 2021-10-07 LAB — COLOGUARD-ABSTRACT: POSITIVE

## 2021-10-15 DIAGNOSIS — R19.5 POSITIVE COLORECTAL CANCER SCREENING USING COLOGUARD TEST: Primary | ICD-10-CM

## 2021-10-16 ENCOUNTER — HEALTH MAINTENANCE LETTER (OUTPATIENT)
Age: 76
End: 2021-10-16

## 2021-10-18 DIAGNOSIS — R19.5 POSITIVE COLORECTAL CANCER SCREENING USING COLOGUARD TEST: Primary | ICD-10-CM

## 2021-10-18 DIAGNOSIS — Z12.11 ENCOUNTER FOR SCREENING COLONOSCOPY: ICD-10-CM

## 2021-10-18 NOTE — PROGRESS NOTES
As the DrRama Of the day, I called the patient to notify him of positive Cologuard test, all questions answered, no abdominal pain, no cramping, patient okay to get a colonoscopy, risk and benefit reviewed.  Colonoscopy order placed to be done at [facility].  Patient also added that he was notified by Dr. Charles last week but did not receive a call to schedule.

## 2021-10-26 ENCOUNTER — TRANSFERRED RECORDS (OUTPATIENT)
Dept: HEALTH INFORMATION MANAGEMENT | Facility: CLINIC | Age: 76
End: 2021-10-26
Payer: COMMERCIAL

## 2021-11-22 PROBLEM — D12.6 TUBULAR ADENOMA OF COLON: Status: ACTIVE | Noted: 2021-11-22

## 2021-12-06 DIAGNOSIS — I10 ESSENTIAL HYPERTENSION: ICD-10-CM

## 2021-12-06 DIAGNOSIS — E78.5 HYPERLIPIDEMIA: ICD-10-CM

## 2021-12-06 NOTE — TELEPHONE ENCOUNTER
Pending Prescriptions:                       Disp   Refills    simvastatin (ZOCOR) 20 MG tablet          90 tab*1            Sig: Take 1 tablet (20 mg) by mouth At Bedtime    metoprolol succinate ER (TOPROL-XL) 50 MG*90 tab*1            Sig: [METOPROLOL SUCCINATE (TOPROL-XL) 50 MG 24 HR           TABLET] TAKE ONE TABLET BY MOUTH ONE TIME DAILY    amLODIPine-benazepril (LOTREL) 10-20 MG c*90 cap*1            Sig: [AMLODIPINE-BENAZEPRIL (LOTREL) 10-20 MG PER           CAPSULE] TAKE ONE CAPSULE BY MOUTH ONE TIME DAILY

## 2021-12-07 RX ORDER — METOPROLOL SUCCINATE 50 MG/1
TABLET, EXTENDED RELEASE ORAL
Qty: 90 TABLET | Refills: 3 | Status: SHIPPED | OUTPATIENT
Start: 2021-12-07 | End: 2022-12-06

## 2021-12-07 RX ORDER — SIMVASTATIN 20 MG
20 TABLET ORAL AT BEDTIME
Qty: 90 TABLET | Refills: 3 | Status: SHIPPED | OUTPATIENT
Start: 2021-12-07 | End: 2023-10-24

## 2021-12-07 RX ORDER — AMLODIPINE AND BENAZEPRIL HYDROCHLORIDE 10; 20 MG/1; MG/1
CAPSULE ORAL
Qty: 90 CAPSULE | Refills: 1 | Status: SHIPPED | OUTPATIENT
Start: 2021-12-07 | End: 2022-06-05

## 2021-12-08 NOTE — TELEPHONE ENCOUNTER
"Last Written Prescription Date:  06/03/2021-Lotrel.  Last Fill Quantity: 90,  # refills: 1   Last office visit provider: 09/21/2021 with Dr Daniels.    Last Written Prescription Date:  06/03/2021-metoprolol succinate.  Last Fill Quantity: 90,  # refills: 1   Last office visit provider: 09/21/2021 with Dr Daniels.    Last Written Prescription Date:  06/03/2021-simvastatin.  Last Fill Quantity: 90,  # refills: 1   Last office visit provider: 09/21/2021 with Dr Daniels      Requested Prescriptions   Pending Prescriptions Disp Refills     simvastatin (ZOCOR) 20 MG tablet 90 tablet 1     Sig: Take 1 tablet (20 mg) by mouth At Bedtime       Statins Protocol Passed - 12/6/2021  4:45 PM        Passed - LDL on file in past 12 months     Recent Labs   Lab Test 09/21/21  0849   LDL 93             Passed - No abnormal creatine kinase in past 12 months     Recent Labs   Lab Test 09/10/18  1130                   Passed - Recent (12 mo) or future (30 days) visit within the authorizing provider's specialty     Patient has had an office visit with the authorizing provider or a provider within the authorizing providers department within the previous 12 mos or has a future within next 30 days. See \"Patient Info\" tab in inbasket, or \"Choose Columns\" in Meds & Orders section of the refill encounter.              Passed - Medication is active on med list        Passed - Patient is age 18 or older           metoprolol succinate ER (TOPROL-XL) 50 MG 24 hr tablet 90 tablet 1     Sig: [METOPROLOL SUCCINATE (TOPROL-XL) 50 MG 24 HR TABLET] TAKE ONE TABLET BY MOUTH ONE TIME DAILY       Beta-Blockers Protocol Passed - 12/6/2021  4:45 PM        Passed - Blood pressure under 140/90 in past 12 months     BP Readings from Last 3 Encounters:   09/21/21 139/86   09/16/20 128/80   10/04/19 120/80                 Passed - Patient is age 6 or older        Passed - Recent (12 mo) or future (30 days) visit within the authorizing provider's specialty     " "Patient has had an office visit with the authorizing provider or a provider within the authorizing providers department within the previous 12 mos or has a future within next 30 days. See \"Patient Info\" tab in inbasket, or \"Choose Columns\" in Meds & Orders section of the refill encounter.              Passed - Medication is active on med list           amLODIPine-benazepril (LOTREL) 10-20 MG capsule 90 capsule 1     Sig: [AMLODIPINE-BENAZEPRIL (LOTREL) 10-20 MG PER CAPSULE] TAKE ONE CAPSULE BY MOUTH ONE TIME DAILY       Calcium Channel Blockers Protocol  Passed - 12/6/2021  4:45 PM        Passed - Blood pressure under 140/90 in past 12 months     BP Readings from Last 3 Encounters:   09/21/21 139/86   09/16/20 128/80   10/04/19 120/80                 Passed - Recent (12 mo) or future (30 days) visit within the authorizing provider's specialty     Patient has had an office visit with the authorizing provider or a provider within the authorizing providers department within the previous 12 mos or has a future within next 30 days. See \"Patient Info\" tab in inInsideTracksket, or \"Choose Columns\" in Meds & Orders section of the refill encounter.              Passed - Medication is active on med list        Passed - Patient is age 18 or older        Passed - Normal serum creatinine on file in past 12 months     Recent Labs   Lab Test 09/21/21  0849   CR 0.94       Ok to refill medication if creatinine is low         ACE Inhibitors (Including Combos) Protocol Passed - 12/6/2021  4:45 PM        Passed - Blood pressure under 140/90 in past 12 months     BP Readings from Last 3 Encounters:   09/21/21 139/86   09/16/20 128/80   10/04/19 120/80                 Passed - Recent (12 mo) or future (30 days) visit within the authorizing provider's specialty     Patient has had an office visit with the authorizing provider or a provider within the authorizing providers department within the previous 12 mos or has a future within next 30 days. " "See \"Patient Info\" tab in inbasket, or \"Choose Columns\" in Meds & Orders section of the refill encounter.              Passed - Medication is active on med list        Passed - Patient is age 18 or older        Passed - Normal serum creatinine on file in past 12 months     Recent Labs   Lab Test 09/21/21  0849   CR 0.94       Ok to refill medication if creatinine is low          Passed - Normal serum potassium on file in past 12 months     Recent Labs   Lab Test 09/21/21  0849   POTASSIUM 4.8                  Alpa Dumont 12/07/21 10:52 PM  "

## 2022-06-04 DIAGNOSIS — I10 ESSENTIAL HYPERTENSION: ICD-10-CM

## 2022-06-05 RX ORDER — AMLODIPINE AND BENAZEPRIL HYDROCHLORIDE 10; 20 MG/1; MG/1
CAPSULE ORAL
Qty: 90 CAPSULE | Refills: 0 | Status: SHIPPED | OUTPATIENT
Start: 2022-06-05 | End: 2022-09-01

## 2022-06-05 NOTE — TELEPHONE ENCOUNTER
"Last Written Prescription Date:  12/7/21  Last Fill Quantity: 90,  # refills: 1   Last office visit provider:  9/21/21     Requested Prescriptions   Pending Prescriptions Disp Refills     amLODIPine-benazepril (LOTREL) 10-20 MG capsule [Pharmacy Med Name: amLODIPine Besy-Benazepril HCl Oral Capsule 10-20 MG] 90 capsule 0     Sig: TAKE ONE CAPSULE BY MOUTH ONE TIME DAILY       Calcium Channel Blockers Protocol  Passed - 6/4/2022 11:30 AM        Passed - Blood pressure under 140/90 in past 12 months     BP Readings from Last 3 Encounters:   09/21/21 139/86   09/16/20 128/80   10/04/19 120/80                 Passed - Recent (12 mo) or future (30 days) visit within the authorizing provider's specialty     Patient has had an office visit with the authorizing provider or a provider within the authorizing providers department within the previous 12 mos or has a future within next 30 days. See \"Patient Info\" tab in inVeebowet, or \"Choose Columns\" in Meds & Orders section of the refill encounter.              Passed - Medication is active on med list        Passed - Patient is age 18 or older        Passed - Normal serum creatinine on file in past 12 months     Recent Labs   Lab Test 09/21/21  0849   CR 0.94       Ok to refill medication if creatinine is low         ACE Inhibitors (Including Combos) Protocol Passed - 6/4/2022 11:30 AM        Passed - Blood pressure under 140/90 in past 12 months     BP Readings from Last 3 Encounters:   09/21/21 139/86   09/16/20 128/80   10/04/19 120/80                 Passed - Recent (12 mo) or future (30 days) visit within the authorizing provider's specialty     Patient has had an office visit with the authorizing provider or a provider within the authorizing providers department within the previous 12 mos or has a future within next 30 days. See \"Patient Info\" tab in inVeebowet, or \"Choose Columns\" in Meds & Orders section of the refill encounter.              Passed - Medication is active " on med list        Passed - Patient is age 18 or older        Passed - Normal serum creatinine on file in past 12 months     Recent Labs   Lab Test 09/21/21  0849   CR 0.94       Ok to refill medication if creatinine is low          Passed - Normal serum potassium on file in past 12 months     Recent Labs   Lab Test 09/21/21  0849   POTASSIUM 4.8                  Mahsa Jimenez, RN 06/05/22 5:26 PM

## 2022-08-31 DIAGNOSIS — I10 ESSENTIAL HYPERTENSION: ICD-10-CM

## 2022-08-31 NOTE — TELEPHONE ENCOUNTER
"Former patient of Frances & has not established care with another provider.  Please assign refill request to covering provider per clinic standard process.    Last Written Prescription Date:  6/5/22  Last Fill Quantity: 90,  # refills: 0   Last office visit provider:  9/21/21     Requested Prescriptions   Pending Prescriptions Disp Refills     amLODIPine-benazepril (LOTREL) 10-20 MG capsule [Pharmacy Med Name: amLODIPine Besy-Benazepril HCl Oral Capsule 10-20 MG] 90 capsule 0     Sig: TAKE ONE CAPSULE BY MOUTH ONE TIME DAILY.       Calcium Channel Blockers Protocol  Passed - 8/31/2022 12:25 PM        Passed - Blood pressure under 140/90 in past 12 months     BP Readings from Last 3 Encounters:   09/21/21 139/86   09/16/20 128/80   10/04/19 120/80                 Passed - Recent (12 mo) or future (30 days) visit within the authorizing provider's specialty     Patient has had an office visit with the authorizing provider or a provider within the authorizing providers department within the previous 12 mos or has a future within next 30 days. See \"Patient Info\" tab in inbasket, or \"Choose Columns\" in Meds & Orders section of the refill encounter.              Passed - Medication is active on med list        Passed - Patient is age 18 or older        Passed - Normal serum creatinine on file in past 12 months     Recent Labs   Lab Test 09/21/21  0849   CR 0.94       Ok to refill medication if creatinine is low         ACE Inhibitors (Including Combos) Protocol Passed - 8/31/2022 12:25 PM        Passed - Blood pressure under 140/90 in past 12 months     BP Readings from Last 3 Encounters:   09/21/21 139/86   09/16/20 128/80   10/04/19 120/80                 Passed - Recent (12 mo) or future (30 days) visit within the authorizing provider's specialty     Patient has had an office visit with the authorizing provider or a provider within the authorizing providers department within the previous 12 mos or has a future within next " "30 days. See \"Patient Info\" tab in inbasket, or \"Choose Columns\" in Meds & Orders section of the refill encounter.              Passed - Medication is active on med list        Passed - Patient is age 18 or older        Passed - Normal serum creatinine on file in past 12 months     Recent Labs   Lab Test 09/21/21  0849   CR 0.94       Ok to refill medication if creatinine is low          Passed - Normal serum potassium on file in past 12 months     Recent Labs   Lab Test 09/21/21  0849   POTASSIUM 4.8                  Mahsa Jimenez, RN 08/31/22 5:26 PM  "

## 2022-09-01 RX ORDER — AMLODIPINE AND BENAZEPRIL HYDROCHLORIDE 10; 20 MG/1; MG/1
CAPSULE ORAL
Qty: 90 CAPSULE | Refills: 0 | Status: SHIPPED | OUTPATIENT
Start: 2022-09-01 | End: 2022-12-06

## 2022-10-01 ENCOUNTER — HEALTH MAINTENANCE LETTER (OUTPATIENT)
Age: 77
End: 2022-10-01

## 2022-10-10 ENCOUNTER — OFFICE VISIT (OUTPATIENT)
Dept: FAMILY MEDICINE | Facility: CLINIC | Age: 77
End: 2022-10-10
Payer: COMMERCIAL

## 2022-10-10 VITALS
RESPIRATION RATE: 16 BRPM | OXYGEN SATURATION: 95 % | WEIGHT: 183.5 LBS | HEART RATE: 75 BPM | BODY MASS INDEX: 24.21 KG/M2 | SYSTOLIC BLOOD PRESSURE: 166 MMHG | TEMPERATURE: 99 F | DIASTOLIC BLOOD PRESSURE: 80 MMHG

## 2022-10-10 DIAGNOSIS — R35.0 URINARY FREQUENCY: Primary | ICD-10-CM

## 2022-10-10 DIAGNOSIS — N39.0 URINARY TRACT INFECTION WITHOUT HEMATURIA, SITE UNSPECIFIED: ICD-10-CM

## 2022-10-10 LAB
ALBUMIN UR-MCNC: NEGATIVE MG/DL
APPEARANCE UR: CLEAR
BACTERIA #/AREA URNS HPF: ABNORMAL /HPF
BILIRUB UR QL STRIP: NEGATIVE
COLOR UR AUTO: YELLOW
GLUCOSE UR STRIP-MCNC: NEGATIVE MG/DL
HGB UR QL STRIP: NEGATIVE
KETONES UR STRIP-MCNC: NEGATIVE MG/DL
LEUKOCYTE ESTERASE UR QL STRIP: ABNORMAL
NITRATE UR QL: NEGATIVE
PH UR STRIP: 6 [PH] (ref 5–8)
RBC #/AREA URNS AUTO: ABNORMAL /HPF
SP GR UR STRIP: 1.01 (ref 1–1.03)
SQUAMOUS #/AREA URNS AUTO: ABNORMAL /LPF
UROBILINOGEN UR STRIP-ACNC: 0.2 E.U./DL
WBC #/AREA URNS AUTO: ABNORMAL /HPF

## 2022-10-10 PROCEDURE — 81001 URINALYSIS AUTO W/SCOPE: CPT | Performed by: FAMILY MEDICINE

## 2022-10-10 PROCEDURE — 99213 OFFICE O/P EST LOW 20 MIN: CPT | Performed by: FAMILY MEDICINE

## 2022-10-10 PROCEDURE — 87086 URINE CULTURE/COLONY COUNT: CPT | Performed by: FAMILY MEDICINE

## 2022-10-10 RX ORDER — CEPHALEXIN 500 MG/1
500 CAPSULE ORAL 2 TIMES DAILY
Qty: 14 CAPSULE | Refills: 0 | Status: SHIPPED | OUTPATIENT
Start: 2022-10-10 | End: 2022-10-17

## 2022-10-10 NOTE — PROGRESS NOTES
Assessment & Plan     Urinary frequency    - UA macro with reflex to Microscopic and Culture - Clinc Collect  - Urine Microscopic Exam  - Urine Culture    Urinary tract infection without hematuria, site unspecified  UTI will get UC and start on keflex  - cephALEXin (KEFLEX) 500 MG capsule  Dispense: 14 capsule; Refill: 0  - Urine Culture Aerobic Bacterial             No follow-ups on file.    Kevin Javier MD  [facility] NANCY Rice is a 77 year old male who presents to clinic today for the following health issues:  Chief Complaint   Patient presents with     Urinary Problem     X 3-4 day. Stinging. Frequency. No cloudiness.     HPI    Stinging with urination.  More frequent and more urge to urinate  3-4 days.  No back pain or fever.        Review of Systems        Objective    BP (!) 166/80 (BP Location: Right arm, Patient Position: Right side, Cuff Size: Adult Regular)   Pulse 75   Temp 99  F (37.2  C) (Oral)   Resp 16   Wt 83.2 kg (183 lb 8 oz)   SpO2 95%   BMI 24.21 kg/m    Physical Exam  Vitals and nursing note reviewed.   Constitutional:       Appearance: Normal appearance.   Eyes:      Pupils: Pupils are equal, round, and reactive to light.   Cardiovascular:      Rate and Rhythm: Normal rate and regular rhythm.      Pulses: Normal pulses.      Heart sounds: Normal heart sounds.   Pulmonary:      Effort: Pulmonary effort is normal.      Breath sounds: Normal breath sounds.   Abdominal:      Tenderness: There is no right CVA tenderness or left CVA tenderness.   Neurological:      Mental Status: He is alert.            UA lab reviewed.

## 2022-10-12 LAB — BACTERIA UR CULT: NORMAL

## 2022-10-13 ENCOUNTER — OFFICE VISIT (OUTPATIENT)
Dept: FAMILY MEDICINE | Facility: CLINIC | Age: 77
End: 2022-10-13
Payer: COMMERCIAL

## 2022-10-13 VITALS
BODY MASS INDEX: 22.91 KG/M2 | HEART RATE: 61 BPM | WEIGHT: 178.5 LBS | OXYGEN SATURATION: 97 % | SYSTOLIC BLOOD PRESSURE: 169 MMHG | TEMPERATURE: 97.5 F | RESPIRATION RATE: 16 BRPM | HEIGHT: 74 IN | DIASTOLIC BLOOD PRESSURE: 81 MMHG

## 2022-10-13 DIAGNOSIS — I10 ESSENTIAL HYPERTENSION: Primary | ICD-10-CM

## 2022-10-13 DIAGNOSIS — E78.5 HYPERLIPIDEMIA, UNSPECIFIED HYPERLIPIDEMIA TYPE: ICD-10-CM

## 2022-10-13 DIAGNOSIS — Z00.00 HEALTHCARE MAINTENANCE: ICD-10-CM

## 2022-10-13 DIAGNOSIS — Z00.00 ENCOUNTER FOR MEDICARE ANNUAL WELLNESS EXAM: ICD-10-CM

## 2022-10-13 DIAGNOSIS — Z87.891 HISTORY OF SMOKING: ICD-10-CM

## 2022-10-13 LAB
ALT SERPL W P-5'-P-CCNC: 25 U/L (ref 10–50)
ANION GAP SERPL CALCULATED.3IONS-SCNC: 10 MMOL/L (ref 7–15)
BUN SERPL-MCNC: 11 MG/DL (ref 8–23)
CALCIUM SERPL-MCNC: 9.4 MG/DL (ref 8.8–10.2)
CHLORIDE SERPL-SCNC: 103 MMOL/L (ref 98–107)
CHOLEST SERPL-MCNC: 145 MG/DL
CREAT SERPL-MCNC: 0.93 MG/DL (ref 0.67–1.17)
DEPRECATED HCO3 PLAS-SCNC: 27 MMOL/L (ref 22–29)
GFR SERPL CREATININE-BSD FRML MDRD: 85 ML/MIN/1.73M2
GLUCOSE SERPL-MCNC: 91 MG/DL (ref 70–99)
HDLC SERPL-MCNC: 44 MG/DL
LDLC SERPL CALC-MCNC: 83 MG/DL
NONHDLC SERPL-MCNC: 101 MG/DL
POTASSIUM SERPL-SCNC: 4.2 MMOL/L (ref 3.4–5.3)
SODIUM SERPL-SCNC: 140 MMOL/L (ref 136–145)
TRIGL SERPL-MCNC: 90 MG/DL

## 2022-10-13 PROCEDURE — 80061 LIPID PANEL: CPT | Performed by: FAMILY MEDICINE

## 2022-10-13 PROCEDURE — G0439 PPPS, SUBSEQ VISIT: HCPCS | Performed by: FAMILY MEDICINE

## 2022-10-13 PROCEDURE — 84460 ALANINE AMINO (ALT) (SGPT): CPT | Performed by: FAMILY MEDICINE

## 2022-10-13 PROCEDURE — 80048 BASIC METABOLIC PNL TOTAL CA: CPT | Performed by: FAMILY MEDICINE

## 2022-10-13 PROCEDURE — 36415 COLL VENOUS BLD VENIPUNCTURE: CPT | Performed by: FAMILY MEDICINE

## 2022-10-13 PROCEDURE — 99213 OFFICE O/P EST LOW 20 MIN: CPT | Mod: 25 | Performed by: FAMILY MEDICINE

## 2022-10-13 ASSESSMENT — ENCOUNTER SYMPTOMS
CONSTIPATION: 0
NAUSEA: 0
JOINT SWELLING: 0
HEARTBURN: 0
HEMATURIA: 0
PALPITATIONS: 0
ARTHRALGIAS: 0
FEVER: 0
SORE THROAT: 0
COUGH: 0
FREQUENCY: 0
DIARRHEA: 0
HEMATOCHEZIA: 0
NERVOUS/ANXIOUS: 0
EYE PAIN: 0
CHILLS: 0
ABDOMINAL PAIN: 0
SHORTNESS OF BREATH: 0
DYSURIA: 0
WEAKNESS: 0
MYALGIAS: 0
HEADACHES: 0
DIZZINESS: 0
PARESTHESIAS: 0

## 2022-10-13 ASSESSMENT — ACTIVITIES OF DAILY LIVING (ADL): CURRENT_FUNCTION: NO ASSISTANCE NEEDED

## 2022-10-13 NOTE — PROGRESS NOTES
"SUBJECTIVE:   Dwayne is a 77 year old who presents for Preventive Visit.      Patient has been advised of split billing requirements and indicates understanding: Yes  Are you in the first 12 months of your Medicare coverage?  No    Healthy Habits:     In general, how would you rate your overall health?  Excellent    Frequency of exercise:  4-5 days/week    Duration of exercise:  15-30 minutes    Do you usually eat at least 4 servings of fruit and vegetables a day, include whole grains    & fiber and avoid regularly eating high fat or \"junk\" foods?  No    Taking medications regularly:  Yes    Medication side effects:  None    Ability to successfully perform activities of daily living:  No assistance needed    Home Safety:  No safety concerns identified    Hearing Impairment:  No hearing concerns    In the past 6 months, have you been bothered by leaking of urine?  No    In general, how would you rate your overall mental or emotional health?  Excellent      PHQ-2 Total Score: 0    Additional concerns today:  No    Do you feel safe in your environment? Yes    Have you ever done Advance Care Planning? (For example, a Health Directive, POLST, or a discussion with a medical provider or your loved ones about your wishes): Yes, advance care planning is on file.      Fall risk  Fallen 2 or more times in the past year?: No  Any fall with injury in the past year?: No  click delete button to remove this line now  Cognitive Screening   1) Repeat 3 items (Leader, Season, Table)    2) Clock draw: NORMAL  3) 3 item recall: Recalls 3 objects  Results: 3 items recalled: COGNITIVE IMPAIRMENT LESS LIKELY    Mini-CogTM Copyright SHANI Emmanuel. Licensed by the author for use in [facility]; reprinted with permission (nicol@.[facility]). All rights reserved.      Do you have sleep apnea, excessive snoring or daytime drowsiness?: no    Reviewed and updated as needed this visit by clinical staff   Tobacco  Allergies  Meds          "     Reviewed and updated as needed this visit by Provider                 Social History     Tobacco Use     Smoking status: Former     Smokeless tobacco: Never   Substance Use Topics     Alcohol use: Yes     Comment: Alcoholic Drinks/day: once per week     If you drink alcohol do you typically have >3 drinks per day or >7 drinks per week? No    9/21/2021    No -             Patient Care Team:  Ayo Daniels MD as PCP - General (Family Practice)  Ayo Daniels MD as Assigned PCP    The following health maintenance items are reviewed in Epic and correct as of today:  Health Maintenance   Topic Date Due     ANNUAL REVIEW OF HM ORDERS  Never done     HEPATITIS B IMMUNIZATION (1 of 3 - 3-dose series) Never done     DTAP/TDAP/TD IMMUNIZATION (1 - Tdap) Never done     ZOSTER IMMUNIZATION (1 of 2) Never done     LUNG CANCER SCREENING  Never done     Pneumococcal Vaccine: 65+ Years (1 - PCV) Never done     MEDICARE ANNUAL WELLNESS VISIT  09/16/2021     COVID-19 Vaccine (4 - Booster for Pfizer series) 12/09/2021     INFLUENZA VACCINE (1) Never done     FALL RISK ASSESSMENT  10/13/2023     LIPID  09/21/2026     ADVANCE CARE PLANNING  09/21/2026     HEPATITIS C SCREENING  Completed     PHQ-2 (once per calendar year)  Completed     IPV IMMUNIZATION  Aged Out     MENINGITIS IMMUNIZATION  Aged Out     COLORECTAL CANCER SCREENING  Discontinued     Patient without concerns today      Review of Systems   Constitutional: Negative for chills and fever.   HENT: Negative for congestion, ear pain, hearing loss and sore throat.    Eyes: Negative for pain and visual disturbance.   Respiratory: Negative for cough and shortness of breath.    Cardiovascular: Negative for chest pain, palpitations and peripheral edema.   Gastrointestinal: Negative for abdominal pain, constipation, diarrhea, heartburn, hematochezia and nausea.   Genitourinary: Negative for dysuria, frequency, genital sores, hematuria, impotence, penile discharge and urgency.  "  Musculoskeletal: Negative for arthralgias, joint swelling and myalgias.   Skin: Negative for rash.   Neurological: Negative for dizziness, weakness, headaches and paresthesias.   Psychiatric/Behavioral: Negative for mood changes. The patient is not nervous/anxious.        OBJECTIVE:   BP (!) 169/81 (BP Location: Left arm, Patient Position: Sitting, Cuff Size: Adult Regular)   Pulse 61   Temp 97.5  F (36.4  C) (Temporal)   Resp 16   Ht 1.89 m (6' 2.41\")   Wt 81 kg (178 lb 8 oz)   SpO2 97%   BMI 22.67 kg/m   Estimated body mass index is 22.67 kg/m  as calculated from the following:    Height as of this encounter: 1.89 m (6' 2.41\").    Weight as of this encounter: 81 kg (178 lb 8 oz).  Physical Exam  Gen- alert, oriented/ appropriately responsive  HEENT- normal cephalic, atraumatic.   Chest- Normal inspiration and expiration.    Clear to ascultation.    No chest wall deformity or scar.  CV- Heart regular rate and rhythm  normal tones, no murmurs   No gallops or rubs.  Ext- appear well perfused, no edema  Skin- warm and dry,   no visualized rash          ASSESSMENT / PLAN:     Problem List Items Addressed This Visit        Medium    Hypertension - Primary     Pressure elevated here, even on recheck.  Check blood pressures at home regularly and typically around 130/80  Lotrel 10/20 and metoprolol ER 50 mg daily.  Check BMP         Relevant Orders    Basic metabolic panel    Hyperlipidemia     On simvastatin 20 mg.  Discussed lack of evidence for statin medication used after age 75 for primary prevention.  In terms of cardiac risk, previous smoker, not ongoing smoking and never very heavy.  No diabetes, he does have hypertension.  Is active and bicycling most days    We will plan on stopping simvastatin.  Lipid and ALT was ordered today and will be checked.         Relevant Orders    ALT    Lipid Profile    History of smoking     Never heavy smoker, does not qualify for lung cancer screening.  Aortic aneurysm " "screening in 2008         Healthcare maintenance     Declines all vaccines  Indicates that he has a healthcare directive, discussed POLST            Patient has been advised of split billing requirements and indicates understanding: Yes    COUNSELING:  Reviewed preventive health counseling, as reflected in patient instructions       Regular exercise       Healthy diet/nutrition    Estimated body mass index is 22.67 kg/m  as calculated from the following:    Height as of this encounter: 1.89 m (6' 2.41\").    Weight as of this encounter: 81 kg (178 lb 8 oz).        He reports that he has quit smoking. He has never used smokeless tobacco.      Appropriate preventive services were discussed with this patient, including applicable screening as appropriate for cardiovascular disease, diabetes, osteopenia/osteoporosis, and glaucoma.  As appropriate for age/gender, discussed screening for colorectal cancer, prostate cancer, breast cancer, and cervical cancer. Checklist reviewing preventive services available has been given to the patient.    Reviewed patients plan of care and provided an AVS. The Basic Care Plan (routine screening as documented in Health Maintenance) for Dwayne meets the Care Plan requirement. This Care Plan has been established and reviewed with the Patient.  Fede Charles MD  [facility]    Identified Health Risks:    The patient was counseled and encouraged to consider modifying their diet and eating habits. He was provided with information on recommended healthy diet options.  "

## 2022-10-13 NOTE — PROGRESS NOTES
"  {PROVIDER CHARTING PREFERENCE:599443}    Radhika Rice is a 77 year old{ACCOMPANIED BY STATEMENT (Optional):278622}, presenting for the following health issues:  Annual Visit (Annual wellness and establish care )      Healthy Habits:     In general, how would you rate your overall health?  Excellent    Frequency of exercise:  4-5 days/week    Duration of exercise:  15-30 minutes    Do you usually eat at least 4 servings of fruit and vegetables a day, include whole grains    & fiber and avoid regularly eating high fat or \"junk\" foods?  No    Taking medications regularly:  Yes    Medication side effects:  None    Ability to successfully perform activities of daily living:  No assistance needed    Home Safety:  No safety concerns identified    Hearing Impairment:  No hearing concerns    In the past 6 months, have you been bothered by leaking of urine?  No    In general, how would you rate your overall mental or emotional health?  Excellent      PHQ-2 Total Score: 0    Additional concerns today:  No       {SUPERLIST (Optional):195426}  {additonal problems for provider to add (Optional):170589}    Review of Systems   Constitutional: Negative for chills and fever.   HENT: Negative for congestion, ear pain, hearing loss and sore throat.    Eyes: Negative for pain and visual disturbance.   Respiratory: Negative for cough and shortness of breath.    Cardiovascular: Negative for chest pain, palpitations and peripheral edema.   Gastrointestinal: Negative for abdominal pain, constipation, diarrhea, heartburn, hematochezia and nausea.   Genitourinary: Negative for dysuria, frequency, genital sores, hematuria, impotence, penile discharge and urgency.   Musculoskeletal: Negative for arthralgias, joint swelling and myalgias.   Skin: Negative for rash.   Neurological: Negative for dizziness, weakness, headaches and paresthesias.   Psychiatric/Behavioral: Negative for mood changes. The patient is not nervous/anxious.     " "  {ROS COMP (Optional):196313}      Objective    BP (!) 169/81 (BP Location: Left arm, Patient Position: Sitting, Cuff Size: Adult Regular)   Pulse 61   Temp 97.5  F (36.4  C) (Temporal)   Resp 16   Ht 1.89 m (6' 2.41\")   Wt 81 kg (178 lb 8 oz)   SpO2 97%   BMI 22.67 kg/m    Body mass index is 22.67 kg/m .  Physical Exam   {Exam List (Optional):054571}    {Diagnostic Test Results (Optional):127584}    {AMBULATORY ATTESTATION (Optional):986093}            "

## 2022-10-13 NOTE — ASSESSMENT & PLAN NOTE
Never heavy smoker, does not qualify for lung cancer screening.  Aortic aneurysm screening in 2008

## 2022-10-13 NOTE — ASSESSMENT & PLAN NOTE
Pressure elevated here, even on recheck.  Check blood pressures at home regularly and typically around 130/80  Lotrel 10/20 and metoprolol ER 50 mg daily.  Check BMP

## 2022-10-13 NOTE — ASSESSMENT & PLAN NOTE
On simvastatin 20 mg.  Discussed lack of evidence for statin medication used after age 75 for primary prevention.  In terms of cardiac risk, previous smoker, not ongoing smoking and never very heavy.  No diabetes, he does have hypertension.  Is active and bicycling most days    We will plan on stopping simvastatin.  Lipid and ALT was ordered today and will be checked.

## 2022-10-13 NOTE — PATIENT INSTRUCTIONS
Patient Education   Personalized Prevention Plan  You are due for the preventive services outlined below.  Your care team is available to assist you in scheduling these services.  If you have already completed any of these items, please share that information with your care team to update in your medical record.  Health Maintenance Due   Topic Date Due     Hepatitis B Vaccine (1 of 3 - 3-dose series) Never done     Diptheria Tetanus Pertussis (DTAP/TDAP/TD) Vaccine (1 - Tdap) Never done     Zoster (Shingles) Vaccine (1 of 2) Never done     LUNG CANCER SCREENING  Never done     Pneumococcal Vaccine (1 - PCV) Never done     Annual Wellness Visit  09/16/2021     COVID-19 Vaccine (4 - Booster for Pfizer series) 12/09/2021     Flu Vaccine (1) Never done       Understanding USDA MyPlate  The USDA has guidelines to help you make healthy food choices. These are called MyPlate. MyPlate shows the food groups that make up healthy meals using the image of a place setting. Before you eat, think about the healthiest choices for what to put on your plate or in your cup or bowl. To learn more about building a healthy plate, visit www.choosemyplate.gov.    The food groups    Fruits. Any fruit or 100% fruit juice counts as part of the Fruit Group. Fruits may be fresh, canned, frozen, or dried, and may be whole, cut-up, or pureed. Make 1/2 of your plate fruits and vegetables.    Vegetables. Any vegetable or 100% vegetable juice counts as a member of the Vegetable Group. Vegetables may be fresh, frozen, canned, or dried. They can be served raw or cooked and may be whole, cut-up, or mashed. Make 1/2 of your plate fruits and vegetables.    Grains. All foods made from grains are part of the Grains Group. These include wheat, rice, oats, cornmeal, and barley. Grains are often used to make foods such as bread, pasta, oatmeal, cereal, tortillas, and grits. Grains should be no more than 1/4 of your plate. At least half of your grains should  be whole grains.    Protein. This group includes meat, poultry, seafood, beans and peas, eggs, processed soy products (such as tofu), nuts (including nut butters), and seeds. Make protein choices no more than 1/4 of your plate. Meat and poultry choices should be lean or low fat.    Dairy. The Dairy Group includes all fluid milk products and foods made from milk that contain calcium, such as yogurt and cheese. (Foods that have little calcium, such as cream, butter, and cream cheese, are not part of this group.) Most dairy choices should be low-fat or fat-free.    Oils. Oils aren't a food group, but they do contain essential nutrients. However it's important to watch your intake of oils. These are fats that are liquid at room temperature. They include canola, corn, olive, soybean, vegetable, and sunflower oil. Foods that are mainly oil include mayonnaise, certain salad dressings, and soft margarines. You likely already get your daily oil allowance from the foods you eat.  Things to limit  Eating healthy also means limiting these things in your diet:       Salt (sodium). Many processed foods have a lot of sodium. To keep sodium intake down, eat fresh vegetables, meats, poultry, and seafood when possible. Purchase low-sodium, reduced-sodium, or no-salt-added food products at the store. And don't add salt to your meals at home. Instead, season them with herbs and spices such as dill, oregano, cumin, and paprika. Or try adding flavor with lemon or lime zest and juice.    Saturated fat. Saturated fats are most often found in animal products such as beef, pork, and chicken. They are often solid at room temperature, such as butter. To reduce your saturated fat intake, choose leaner cuts of meat and poultry. And try healthier cooking methods such as grilling, broiling, roasting, or baking. For a simple lower-fat swap, use plain nonfat yogurt instead of mayonnaise when making potato salad or macaroni salad.    Added sugars.  These are sugars added to foods. They are in foods such as ice cream, candy, soda, fruit drinks, sports drinks, energy drinks, cookies, pastries, jams, and syrups. Cut down on added sugars by sharing sweet treats with a family member or friend. You can also choose fruit for dessert, and drink water or other unsweetened beverages.     [employer] last reviewed this educational content on 6/1/2020 2000-2021 [employer]. All rights reserved. This information is not intended as a substitute for professional medical care. Always follow your healthcare professional's instructions.

## 2022-12-04 DIAGNOSIS — I10 ESSENTIAL HYPERTENSION: ICD-10-CM

## 2022-12-05 NOTE — TELEPHONE ENCOUNTER
"Former patient of Frances & has not established care with another provider.  Please assign refill request to covering provider per clinic standard process.    Routing refill request to provider for review/approval because:  bp out of range    Last Written Prescription Date:  12/7/21  Last Fill Quantity: 90,  # refills: 3   Last office visit provider:  10/13/22     Requested Prescriptions   Pending Prescriptions Disp Refills     metoprolol succinate ER (TOPROL XL) 50 MG 24 hr tablet [Pharmacy Med Name: Metoprolol Succinate ER Oral Tablet Extended Release 24 Hour 50 MG] 90 tablet 0     Sig: TAKE ONE TABLET BY MOUTH ONE TIME DAILY       Beta-Blockers Protocol Failed - 12/4/2022  8:49 AM        Failed - Blood pressure under 140/90 in past 12 months     BP Readings from Last 3 Encounters:   10/13/22 (!) 169/81   10/10/22 (!) 166/80   09/21/21 139/86                 Passed - Patient is age 6 or older        Passed - Recent (12 mo) or future (30 days) visit within the authorizing provider's specialty     Patient has had an office visit with the authorizing provider or a provider within the authorizing providers department within the previous 12 mos or has a future within next 30 days. See \"Patient Info\" tab in inbasket, or \"Choose Columns\" in Meds & Orders section of the refill encounter.              Passed - Medication is active on med Mahsa Fermin RN 12/05/22 5:33 PM  "

## 2022-12-05 NOTE — TELEPHONE ENCOUNTER
"Former patient of Frances & has not established care with another provider.  Please assign refill request to covering provider per clinic standard process.      Routing refill request to provider for review/approval because:  bp out of range    Last Written Prescription Date:  9/1/22  Last Fill Quantity: 90,  # refills: 0   Last office visit provider:  10/13/22     Requested Prescriptions   Pending Prescriptions Disp Refills     amLODIPine-benazepril (LOTREL) 10-20 MG capsule [Pharmacy Med Name: amLODIPine Besy-Benazepril HCl Oral Capsule 10-20 MG] 90 capsule 0     Sig: TAKE ONE CAPSULE BY MOUTH ONE TIME DAILY       Calcium Channel Blockers Protocol  Failed - 12/4/2022  8:48 AM        Failed - Blood pressure under 140/90 in past 12 months     BP Readings from Last 3 Encounters:   10/13/22 (!) 169/81   10/10/22 (!) 166/80 09/21/21 139/86                 Passed - Recent (12 mo) or future (30 days) visit within the authorizing provider's specialty     Patient has had an office visit with the authorizing provider or a provider within the authorizing providers department within the previous 12 mos or has a future within next 30 days. See \"Patient Info\" tab in inbasket, or \"Choose Columns\" in Meds & Orders section of the refill encounter.              Passed - Medication is active on med list        Passed - Patient is age 18 or older        Passed - Normal serum creatinine on file in past 12 months     Recent Labs   Lab Test 10/13/22  1429   CR 0.93       Ok to refill medication if creatinine is low         ACE Inhibitors (Including Combos) Protocol Failed - 12/4/2022  8:48 AM        Failed - Blood pressure under 140/90 in past 12 months     BP Readings from Last 3 Encounters:   10/13/22 (!) 169/81   10/10/22 (!) 166/80 09/21/21 139/86                 Passed - Recent (12 mo) or future (30 days) visit within the authorizing provider's specialty     Patient has had an office visit with the authorizing provider or a " "provider within the authorizing providers department within the previous 12 mos or has a future within next 30 days. See \"Patient Info\" tab in inbasket, or \"Choose Columns\" in Meds & Orders section of the refill encounter.              Passed - Medication is active on med list        Passed - Patient is age 18 or older        Passed - Normal serum creatinine on file in past 12 months     Recent Labs   Lab Test 10/13/22  1429   CR 0.93       Ok to refill medication if creatinine is low          Passed - Normal serum potassium on file in past 12 months     Recent Labs   Lab Test 10/13/22  1429   POTASSIUM 4.2                  Mahsa Jimenez RN 12/05/22 5:31 PM  "

## 2022-12-06 RX ORDER — AMLODIPINE AND BENAZEPRIL HYDROCHLORIDE 10; 20 MG/1; MG/1
CAPSULE ORAL
Qty: 90 CAPSULE | Refills: 3 | Status: SHIPPED | OUTPATIENT
Start: 2022-12-06 | End: 2023-11-27

## 2022-12-06 RX ORDER — METOPROLOL SUCCINATE 50 MG/1
TABLET, EXTENDED RELEASE ORAL
Qty: 90 TABLET | Refills: 3 | Status: SHIPPED | OUTPATIENT
Start: 2022-12-06 | End: 2023-11-27

## 2023-02-04 ENCOUNTER — HEALTH MAINTENANCE LETTER (OUTPATIENT)
Age: 78
End: 2023-02-04

## 2023-10-23 NOTE — PATIENT INSTRUCTIONS
Patient Education   Personalized Prevention Plan  You are due for the preventive services outlined below.  Your care team is available to assist you in scheduling these services.  If you have already completed any of these items, please share that information with your care team to update in your medical record.  Health Maintenance Due   Topic Date Due     Diptheria Tetanus Pertussis (DTAP/TDAP/TD) Vaccine (1 - Tdap) Never done     Zoster (Shingles) Vaccine (1 of 2) Never done     LUNG CANCER SCREENING  Never done     RSV VACCINE 60+ (1 - 1-dose 60+ series) Never done     Pneumococcal Vaccine (1 - PCV) Never done     PHQ-2 (once per calendar year)  01/01/2023     Flu Vaccine (1) Never done     COVID-19 Vaccine (4 - 2023-24 season) 09/01/2023     ANNUAL REVIEW OF HM ORDERS  10/13/2023     FALL RISK ASSESSMENT  10/13/2023

## 2023-10-24 ENCOUNTER — OFFICE VISIT (OUTPATIENT)
Dept: FAMILY MEDICINE | Facility: CLINIC | Age: 78
End: 2023-10-24
Payer: COMMERCIAL

## 2023-10-24 VITALS
HEIGHT: 75 IN | WEIGHT: 189 LBS | SYSTOLIC BLOOD PRESSURE: 120 MMHG | DIASTOLIC BLOOD PRESSURE: 68 MMHG | OXYGEN SATURATION: 95 % | HEART RATE: 61 BPM | RESPIRATION RATE: 14 BRPM | BODY MASS INDEX: 23.5 KG/M2 | TEMPERATURE: 98.3 F

## 2023-10-24 DIAGNOSIS — E78.5 HYPERLIPIDEMIA, UNSPECIFIED HYPERLIPIDEMIA TYPE: ICD-10-CM

## 2023-10-24 DIAGNOSIS — Z87.891 HISTORY OF SMOKING: ICD-10-CM

## 2023-10-24 DIAGNOSIS — I10 ESSENTIAL HYPERTENSION: ICD-10-CM

## 2023-10-24 DIAGNOSIS — Z00.00 ENCOUNTER FOR MEDICARE ANNUAL WELLNESS EXAM: Primary | ICD-10-CM

## 2023-10-24 DIAGNOSIS — Z00.00 HEALTHCARE MAINTENANCE: ICD-10-CM

## 2023-10-24 DIAGNOSIS — D12.6 TUBULAR ADENOMA OF COLON: ICD-10-CM

## 2023-10-24 LAB
ALT SERPL W P-5'-P-CCNC: 13 U/L (ref 0–70)
ANION GAP SERPL CALCULATED.3IONS-SCNC: 11 MMOL/L (ref 7–15)
BUN SERPL-MCNC: 13.4 MG/DL (ref 8–23)
CALCIUM SERPL-MCNC: 9 MG/DL (ref 8.8–10.2)
CHLORIDE SERPL-SCNC: 102 MMOL/L (ref 98–107)
CHOLEST SERPL-MCNC: 212 MG/DL
CREAT SERPL-MCNC: 0.92 MG/DL (ref 0.67–1.17)
DEPRECATED HCO3 PLAS-SCNC: 26 MMOL/L (ref 22–29)
EGFRCR SERPLBLD CKD-EPI 2021: 85 ML/MIN/1.73M2
GLUCOSE SERPL-MCNC: 102 MG/DL (ref 70–99)
HDLC SERPL-MCNC: 47 MG/DL
LDLC SERPL CALC-MCNC: 150 MG/DL
NONHDLC SERPL-MCNC: 165 MG/DL
POTASSIUM SERPL-SCNC: 4.2 MMOL/L (ref 3.4–5.3)
SODIUM SERPL-SCNC: 139 MMOL/L (ref 135–145)
TRIGL SERPL-MCNC: 77 MG/DL

## 2023-10-24 PROCEDURE — 99397 PER PM REEVAL EST PAT 65+ YR: CPT | Performed by: FAMILY MEDICINE

## 2023-10-24 PROCEDURE — 80061 LIPID PANEL: CPT | Performed by: FAMILY MEDICINE

## 2023-10-24 PROCEDURE — 84460 ALANINE AMINO (ALT) (SGPT): CPT | Performed by: FAMILY MEDICINE

## 2023-10-24 PROCEDURE — 80048 BASIC METABOLIC PNL TOTAL CA: CPT | Performed by: FAMILY MEDICINE

## 2023-10-24 PROCEDURE — 36415 COLL VENOUS BLD VENIPUNCTURE: CPT | Performed by: FAMILY MEDICINE

## 2023-10-24 RX ORDER — RESPIRATORY SYNCYTIAL VIRUS VACCINE 120MCG/0.5
0.5 KIT INTRAMUSCULAR ONCE
Qty: 1 EACH | Refills: 0 | Status: CANCELLED | OUTPATIENT
Start: 2023-10-24 | End: 2023-10-24

## 2023-10-24 ASSESSMENT — ENCOUNTER SYMPTOMS
SHORTNESS OF BREATH: 0
HEARTBURN: 0
NAUSEA: 0
MYALGIAS: 0
PALPITATIONS: 0
HEADACHES: 0
DIZZINESS: 0
COUGH: 0
JOINT SWELLING: 0
WEAKNESS: 0
FREQUENCY: 0
FEVER: 0
CONSTIPATION: 0
HEMATURIA: 0
CHILLS: 0
NERVOUS/ANXIOUS: 0
HEMATOCHEZIA: 0
DYSURIA: 0
DIARRHEA: 0
SORE THROAT: 0
ARTHRALGIAS: 0
PARESTHESIAS: 0
EYE PAIN: 0
ABDOMINAL PAIN: 0

## 2023-10-24 ASSESSMENT — ACTIVITIES OF DAILY LIVING (ADL): CURRENT_FUNCTION: NO ASSISTANCE NEEDED

## 2023-10-24 NOTE — ASSESSMENT & PLAN NOTE
We decided to stop simvastatin 20 last year based on age/evidence showing lack of benefit for primary prevention above 75.  Recheck lipid/ alt

## 2023-10-24 NOTE — ASSESSMENT & PLAN NOTE
Colonoscopy 2021, 5-year follow-up.  Patient will be 81 2026-discussed and plans to get go ahead with colonoscopy

## 2023-10-24 NOTE — ASSESSMENT & PLAN NOTE
Declines all vaccines, discussed.  Working on healthcare directives, daughter would be her son to decide.

## 2023-10-24 NOTE — PROGRESS NOTES
"SUBJECTIVE:   Dwayne is a 78 year old who presents for Preventive Visit.      10/24/2023    10:05 AM   Additional Questions   Roomed by Merline GUERRERO.       Are you in the first 12 months of your Medicare coverage?  No    Healthy Habits:     In general, how would you rate your overall health?  Excellent    Frequency of exercise:  4-5 days/week    Duration of exercise:  15-30 minutes    Do you usually eat at least 4 servings of fruit and vegetables a day, include whole grains    & fiber and avoid regularly eating high fat or \"junk\" foods?  No    Taking medications regularly:  Yes    Medication side effects:  None    Ability to successfully perform activities of daily living:  No assistance needed    Home Safety:  No safety concerns identified    Hearing Impairment:  Need to ask people to speak up or repeat themselves    In the past 6 months, have you been bothered by leaking of urine?  No    In general, how would you rate your overall mental or emotional health?  Excellent    Additional concerns today:  No      Today's PHQ-2 Score:       10/24/2023     9:55 AM   PHQ-2 ( 1999 [employer])   Q1: Little interest or pleasure in doing things 0   Q2: Feeling down, depressed or hopeless 0   PHQ-2 Score 0   Q1: Little interest or pleasure in doing things Not at all   Q2: Feeling down, depressed or hopeless Not at all   PHQ-2 Score 0           Have you ever done Advance Care Planning? (For example, a Health Directive, POLST, or a discussion with a medical provider or your loved ones about your wishes): No, advance care planning information given to patient to review.  Patient declined advance care planning discussion at this time.       Fall risk  Fallen 2 or more times in the past year?: No  Any fall with injury in the past year?: No    Cognitive Screening   1) Repeat 3 items (River, Nation, Finger)    2) Clock draw: NORMAL  3) 3 item recall: Recalls 3 objects  Results: 3 items recalled: COGNITIVE IMPAIRMENT LESS LIKELY    Mini-CogTM " Copyright SHANI Emmanuel. Licensed by the author for use in [facility]; reprinted with permission (nicol@[facility]). All rights reserved.          Reviewed and updated as needed this visit by clinical staff   Tobacco  Allergies  Meds              Reviewed and updated as needed this visit by Provider                 Social History     Tobacco Use     Smoking status: Former     Types: Cigarettes     Passive exposure: Past     Smokeless tobacco: Never   Substance Use Topics     Alcohol use: Yes     Comment: Alcoholic Drinks/day: once per week             10/24/2023     9:55 AM   Alcohol Use   Prescreen: >3 drinks/day or >7 drinks/week? No          No data to display              Do you have a current opioid prescription? No  Do you use any other controlled substances or medications that are not prescribed by a provider? Alcohol and Cannabis              Current providers sharing in care for this patient include:   Patient Care Team:  Fede Charles MD as PCP - General (Family Medicine)  Fede Charles MD as Assigned PCP    The following health maintenance items are reviewed in Epic and correct as of today:  Health Maintenance   Topic Date Due     DTAP/TDAP/TD IMMUNIZATION (1 - Tdap) Never done     ZOSTER IMMUNIZATION (1 of 2) Never done     LUNG CANCER SCREENING  Never done     RSV VACCINE 60+ (1 - 1-dose 60+ series) Never done     Pneumococcal Vaccine: 65+ Years (1 - PCV) Never done     INFLUENZA VACCINE (1) Never done     COVID-19 Vaccine (4 - 2023-24 season) 09/01/2023     ANNUAL REVIEW OF HM ORDERS  10/13/2023     MEDICARE ANNUAL WELLNESS VISIT  10/24/2024     FALL RISK ASSESSMENT  10/24/2024     LIPID  10/13/2027     ADVANCE CARE PLANNING  10/13/2027     HEPATITIS C SCREENING  Completed     PHQ-2 (once per calendar year)  Completed     IPV IMMUNIZATION  Aged Out     HPV IMMUNIZATION  Aged Out     MENINGITIS IMMUNIZATION  Aged Out     COLORECTAL CANCER SCREENING  Discontinued               Review of  "Systems   Constitutional:  Negative for chills and fever.   HENT:  Negative for congestion, ear pain, hearing loss and sore throat.    Eyes:  Negative for pain and visual disturbance.   Respiratory:  Negative for cough and shortness of breath.    Cardiovascular:  Negative for chest pain, palpitations and peripheral edema.   Gastrointestinal:  Negative for abdominal pain, constipation, diarrhea, heartburn, hematochezia and nausea.   Genitourinary:  Negative for dysuria, frequency, genital sores, hematuria, impotence, penile discharge and urgency.   Musculoskeletal:  Negative for arthralgias, joint swelling and myalgias.   Skin:  Negative for rash.   Neurological:  Negative for dizziness, weakness, headaches and paresthesias.   Psychiatric/Behavioral:  Negative for mood changes. The patient is not nervous/anxious.          OBJECTIVE:   BP (!) 148/70 (BP Location: Left arm, Patient Position: Sitting, Cuff Size: Adult Regular)   Pulse 61   Temp 98.3  F (36.8  C)   Resp 14   Ht 1.905 m (6' 3\")   Wt 85.7 kg (189 lb)   SpO2 95%   BMI 23.62 kg/m   Estimated body mass index is 23.62 kg/m  as calculated from the following:    Height as of this encounter: 1.905 m (6' 3\").    Weight as of this encounter: 85.7 kg (189 lb).  Physical Exam  Gen- alert, oriented/ appropriately responsive  HEENT- normal cephalic, atraumatic.   Chest- Normal inspiration and expiration.   Clear to ascultation.   No chest wall deformity or scar.  CV- Heart regular rate and rhythm  normal tones,  No murmurs   No gallops or rubs  Abdomen appearance is normal  Bowel sounds are normal.  soft and non-tender no noted rebound or guarding.    There is no organomegaly or mass noted.    Ext- appear well perfused, no edema  Skin- warm and dry,   no visualized rash          ASSESSMENT / PLAN:     Problem List Items Addressed This Visit          Digestive    Tubular adenoma of colon     Colonoscopy 2021, 5-year follow-up.  Patient will be 81 2026-discussed " and plans to get go ahead with colonoscopy            Endocrine    Hyperlipidemia     We decided to stop simvastatin 20 last year based on age/evidence showing lack of benefit for primary prevention above 75.  Recheck lipid/ alt         Relevant Orders    ALT    Lipid Profile       Circulatory    Hypertension     Blood pressure well controlled on Lotrel 10/20, check BMP         Relevant Orders    Basic metabolic panel       Behavioral    History of smoking       Other    Healthcare maintenance     Declines all vaccines, discussed.  Working on healthcare directives, daughter would be her son to decide.          Other Visit Diagnoses       Encounter for Medicare annual wellness exam    -  Primary                  COUNSELING:  Reviewed preventive health counseling, as reflected in patient instructions       Regular exercise       Healthy diet/nutrition        He reports that he has quit smoking. His smoking use included cigarettes. He has been exposed to tobacco smoke. He has never used smokeless tobacco.      Appropriate preventive services were discussed with this patient, including applicable screening as appropriate for fall prevention, nutrition, physical activity, Tobacco-use cessation, weight loss and cognition.  Checklist reviewing preventive services available has been given to the patient.    Reviewed patients plan of care and provided an AVS. The Basic Care Plan (routine screening as documented in Health Maintenance) for Dwayne meets the Care Plan requirement. This Care Plan has been established and reviewed with the Patient.        Fede Charles MD  [facility]    Identified Health Risks:  I have reviewed Opioid Use Disorder and Substance Use Disorder risk factors and made any needed referrals.

## 2023-11-26 DIAGNOSIS — I10 ESSENTIAL HYPERTENSION: ICD-10-CM

## 2023-11-27 RX ORDER — AMLODIPINE AND BENAZEPRIL HYDROCHLORIDE 10; 20 MG/1; MG/1
CAPSULE ORAL
Qty: 90 CAPSULE | Refills: 0 | Status: SHIPPED | OUTPATIENT
Start: 2023-11-27 | End: 2024-02-29

## 2023-11-27 RX ORDER — METOPROLOL SUCCINATE 50 MG/1
TABLET, EXTENDED RELEASE ORAL
Qty: 90 TABLET | Refills: 0 | Status: SHIPPED | OUTPATIENT
Start: 2023-11-27 | End: 2024-02-29

## 2024-02-29 DIAGNOSIS — I10 ESSENTIAL HYPERTENSION: ICD-10-CM

## 2024-02-29 RX ORDER — AMLODIPINE AND BENAZEPRIL HYDROCHLORIDE 10; 20 MG/1; MG/1
CAPSULE ORAL
Qty: 90 CAPSULE | Refills: 0 | Status: SHIPPED | OUTPATIENT
Start: 2024-02-29 | End: 2024-03-06

## 2024-02-29 RX ORDER — METOPROLOL SUCCINATE 50 MG/1
TABLET, EXTENDED RELEASE ORAL
Qty: 90 TABLET | Refills: 0 | Status: SHIPPED | OUTPATIENT
Start: 2024-02-29 | End: 2024-05-27

## 2024-02-29 NOTE — TELEPHONE ENCOUNTER
Medication Question or Refill    Contacts         Type Contact Phone/Fax    02/29/2024 03:28 PM CST Phone (Incoming) Dwayne Shelton (Self) [phone] (M)        What medication are you calling about (include dose and sig)?:     amLODIPine-benazepril (LOTREL) 10-20 MG capsule    Disp Refills Start End CHARAN   metoprolol succinate ER (TOPROL XL) 50 MG 24 hr tablet          Preferred Pharmacy Patient does not want to use a different pharmacy despite IT problems with [facility]    [id number] - [place]], [place] - [address] [place] [id number]  Phone: [phone] Fax: [phone]    Who prescribed the medication?: Dr Charles    Do you need a refill? Yes    Patient offered an appointment? No, patient last seen by PCP, Dr Charles      Could we send this information to you in [facility] [place] or would you prefer to receive a phone call?:   Patient would prefer a phone call   Okay to leave a detailed message?: Yes at Cell number on file:    Telephone Information:   Mobile [phone]     Message routed to Dr Charles for review / plan    Danay Stokes RN  [facility]

## 2024-03-05 DIAGNOSIS — I10 ESSENTIAL HYPERTENSION: ICD-10-CM

## 2024-03-06 RX ORDER — AMLODIPINE AND BENAZEPRIL HYDROCHLORIDE 10; 20 MG/1; MG/1
CAPSULE ORAL
Qty: 90 CAPSULE | Refills: 0 | Status: SHIPPED | OUTPATIENT
Start: 2024-03-06 | End: 2024-05-27

## 2024-05-26 DIAGNOSIS — I10 ESSENTIAL HYPERTENSION: ICD-10-CM

## 2024-05-27 RX ORDER — AMLODIPINE AND BENAZEPRIL HYDROCHLORIDE 10; 20 MG/1; MG/1
CAPSULE ORAL
Qty: 90 CAPSULE | Refills: 0 | Status: SHIPPED | OUTPATIENT
Start: 2024-05-27 | End: 2024-08-07

## 2024-05-27 RX ORDER — METOPROLOL SUCCINATE 50 MG/1
TABLET, EXTENDED RELEASE ORAL
Qty: 90 TABLET | Refills: 0 | Status: SHIPPED | OUTPATIENT
Start: 2024-05-27 | End: 2024-08-07

## 2024-08-03 SDOH — HEALTH STABILITY: PHYSICAL HEALTH: ON AVERAGE, HOW MANY MINUTES DO YOU ENGAGE IN EXERCISE AT THIS LEVEL?: 40 MIN

## 2024-08-03 SDOH — HEALTH STABILITY: PHYSICAL HEALTH: ON AVERAGE, HOW MANY DAYS PER WEEK DO YOU ENGAGE IN MODERATE TO STRENUOUS EXERCISE (LIKE A BRISK WALK)?: 5 DAYS

## 2024-08-03 ASSESSMENT — SOCIAL DETERMINANTS OF HEALTH (SDOH): HOW OFTEN DO YOU GET TOGETHER WITH FRIENDS OR RELATIVES?: ONCE A WEEK

## 2024-08-07 ENCOUNTER — OFFICE VISIT (OUTPATIENT)
Dept: FAMILY MEDICINE | Facility: CLINIC | Age: 79
End: 2024-08-07
Payer: COMMERCIAL

## 2024-08-07 VITALS
WEIGHT: 187 LBS | RESPIRATION RATE: 16 BRPM | HEART RATE: 56 BPM | SYSTOLIC BLOOD PRESSURE: 157 MMHG | HEIGHT: 75 IN | TEMPERATURE: 97.8 F | DIASTOLIC BLOOD PRESSURE: 76 MMHG | BODY MASS INDEX: 23.25 KG/M2 | OXYGEN SATURATION: 97 %

## 2024-08-07 DIAGNOSIS — D12.6 TUBULAR ADENOMA OF COLON: ICD-10-CM

## 2024-08-07 DIAGNOSIS — E78.5 HYPERLIPIDEMIA, UNSPECIFIED HYPERLIPIDEMIA TYPE: ICD-10-CM

## 2024-08-07 DIAGNOSIS — I10 ESSENTIAL HYPERTENSION: ICD-10-CM

## 2024-08-07 DIAGNOSIS — Z00.00 HEALTHCARE MAINTENANCE: Primary | ICD-10-CM

## 2024-08-07 PROCEDURE — G0439 PPPS, SUBSEQ VISIT: HCPCS | Performed by: FAMILY MEDICINE

## 2024-08-07 RX ORDER — AMLODIPINE AND BENAZEPRIL HYDROCHLORIDE 10; 20 MG/1; MG/1
CAPSULE ORAL
Qty: 90 CAPSULE | Refills: 0 | Status: SHIPPED | OUTPATIENT
Start: 2024-08-07

## 2024-08-07 RX ORDER — METOPROLOL SUCCINATE 50 MG/1
TABLET, EXTENDED RELEASE ORAL
Qty: 90 TABLET | Refills: 0 | Status: SHIPPED | OUTPATIENT
Start: 2024-08-07

## 2024-08-07 NOTE — ASSESSMENT & PLAN NOTE
Declines all vaccines  Has history of tubular adenoma of colon and is due for a follow-up colonoscopy in 2026.  History of very light smoking, does not qualify for lung cancer screening,  Discussed advanced directives.  Working with daughter on that.  Daughter and son would be medical decision makers.

## 2024-08-07 NOTE — ASSESSMENT & PLAN NOTE
Discontinued statin medication, use for primary prevention last year.  LDL cholesterol after medication stopped was 150.

## 2024-08-07 NOTE — PROGRESS NOTES
Preventive Care Visit  [facility]  Fede Charles MD, Family Medicine  Aug 7, 2024      Assessment & Plan   Problem List Items Addressed This Visit       Tubular adenoma of colon    Hypertension     Blood pressure elevated today, but 132/73 this morning from home and has been consistently normal at home.  No changes.  Will check BMP next visit.  This was done about 10 months ago and it has been normal throughout so I do not think we need to repeat that today.         Hyperlipidemia     Discontinued statin medication, use for primary prevention last year.  LDL cholesterol after medication stopped was 150.         Healthcare maintenance - Primary     Declines all vaccines  Has history of tubular adenoma of colon and is due for a follow-up colonoscopy in 2026.  History of very light smoking, does not qualify for lung cancer screening,  Discussed advanced directives.  Working with daughter on that.  Daughter and son would be medical decision makers.                    Counseling  Appropriate preventive services were addressed with this patient via screening, questionnaire, or discussion as appropriate for fall prevention, nutrition, physical activity, Tobacco-use cessation, weight loss and cognition.  Checklist reviewing preventive services available has been given to the patient.  Reviewed patient's diet, addressing concerns and/or questions.   The patient was provided with written information regarding signs of hearing loss.           Radhika Rice is a 79 year old, presenting for the following:  Wellness Visit        8/7/2024     9:16 AM   Additional Questions   Roomed by jacquelyn   Accompanied by self         Health Care Directive  Patient does not have a Health Care Directive or Living Will: Discussed advance care planning with patient; information given to patient to review.    HPI  79-year-old here for annual wellness visit.  Last seen 10/24/2023.  History of hypertension which was  controlled at that time, takes Lotrel 10/20 and metoprolol ER 50 mg daily.  History of tubular adenoma, due for colonoscopy in 2026..  History of hyperlipidemia, discussed stopping statin based on age.    Remote history of smoking.  Never heavy, does not qualify for lung cancer screening.  Had aortic aneurysm screening in 2008.  Vaccines that he is due for include pneumococcal which she could get here, RSV, zoster and Tdap.            8/3/2024   General Health   How would you rate your overall physical health? Excellent   Feel stress (tense, anxious, or unable to sleep) Not at all            8/3/2024   Nutrition   Diet: Regular (no restrictions)            8/3/2024   Exercise   Days per week of moderate/strenous exercise 5 days   Average minutes spent exercising at this level 40 min            8/3/2024   Social Factors   Frequency of gathering with friends or relatives Once a week   Worry food won't last until get money to buy more No   Food not last or not have enough money for food? No   Do you have housing? (Housing is defined as stable permanent housing and does not include staying ouside in a car, in a tent, in an abandoned building, in an overnight shelter, or couch-surfing.) Yes   Are you worried about losing your housing? No   Lack of transportation? No   Unable to get utilities (heat,electricity)? No            8/3/2024   Fall Risk   Fallen 2 or more times in the past year? No   Trouble with walking or balance? No             8/3/2024   Activities of Daily Living- Home Safety   Needs help with the following daily activites None of the above   Safety concerns in the home None of the above            8/3/2024   Dental   Dentist two times every year? Yes            8/3/2024   Hearing Screening   Hearing concerns? (!) IT'S HARD TO FOLLOW A CONVERSATION IN A NOISY RESTAURANT OR CROWDED ROOM.            8/3/2024   Driving Risk Screening   Patient/family members have concerns about driving No            8/3/2024    General Alertness/Fatigue Screening   Have you been more tired than usual lately? No            8/3/2024   Urinary Incontinence Screening   Bothered by leaking urine in past 6 months No            8/3/2024   TB Screening   Were you born outside of the [place]? No            Today's PHQ-2 Score:       8/7/2024     6:06 AM   PHQ-2 ( 1999 Pfizer)   Q1: Little interest or pleasure in doing things 0   Q2: Feeling down, depressed or hopeless 0   PHQ-2 Score 0   Q1: Little interest or pleasure in doing things Not at all   Q2: Feeling down, depressed or hopeless Not at all   PHQ-2 Score 0           8/3/2024   Substance Use   Alcohol more than 3/day or more than 7/wk No   Do you have a current opioid prescription? No   How severe/bad is pain from 1 to 10? 0/10 (No Pain)   Do you use any other substances recreationally? No        Social History     Tobacco Use    Smoking status: Former     Types: Cigarettes     Passive exposure: Past    Smokeless tobacco: Never   Vaping Use    Vaping status: Never Used   Substance Use Topics    Alcohol use: Yes     Comment: Alcoholic Drinks/day: once per week    Drug use: Never       ASCVD Risk   The 10-year ASCVD risk score (Shauna MONROY, et al., 2019) is: 48.8%    Values used to calculate the score:      Age: 79 years      Sex: Male      Is Non- : No      Diabetic: No      Tobacco smoker: No      Systolic Blood Pressure: 160 mmHg      Is BP treated: Yes      HDL Cholesterol: 47 mg/dL      Total Cholesterol: 212 mg/dL            Reviewed and updated as needed this visit by Provider                      Current providers sharing in care for this patient include:  Patient Care Team:  Fede Charles MD as PCP - General (Family Medicine)  Fede Charles MD as Assigned PCP    The following health maintenance items are reviewed in Epic and correct as of today:  Health Maintenance   Topic Date Due    DTAP/TDAP/TD IMMUNIZATION (1 - Tdap) Never done    ZOSTER IMMUNIZATION  "(1 of 2) Never done    LUNG CANCER SCREENING  Never done    RSV VACCINE (Pregnancy & 60+) (1 - 1-dose 60+ series) Never done    Pneumococcal Vaccine: 65+ Years (1 of 1 - PCV) Never done    COVID-19 Vaccine (4 - 2023-24 season) 09/01/2023    INFLUENZA VACCINE (1) 09/01/2024    MEDICARE ANNUAL WELLNESS VISIT  10/24/2024    LIPID  10/24/2024    ANNUAL REVIEW OF HM ORDERS  10/24/2024    FALL RISK ASSESSMENT  08/07/2025    GLUCOSE  10/24/2026    ADVANCE CARE PLANNING  10/24/2028    HEPATITIS C SCREENING  Completed    PHQ-2 (once per calendar year)  Completed    IPV IMMUNIZATION  Aged Out    HPV IMMUNIZATION  Aged Out    MENINGITIS IMMUNIZATION  Aged Out    RSV MONOCLONAL ANTIBODY  Aged Out    COLORECTAL CANCER SCREENING  Discontinued            Objective    Exam  BP (!) 160/80 (BP Location: Left arm, Patient Position: Sitting, Cuff Size: Adult Regular)   Pulse 56   Temp 97.8  F (36.6  C) (Temporal)   Resp 16   Ht 1.905 m (6' 3\")   Wt 84.8 kg (187 lb)   SpO2 97%   BMI 23.37 kg/m     Estimated body mass index is 23.37 kg/m  as calculated from the following:    Height as of this encounter: 1.905 m (6' 3\").    Weight as of this encounter: 84.8 kg (187 lb).    Physical Exam  Gen- alert, oriented/ appropriately responsive  HEENT- normal cephalic, atraumatic.   Oral cavity grossly normal  Chest- Normal inspiration and expiration.    Clear to ascultation.    No chest wall deformity or scar.  CV- Heart regular rate and rhythm  normal tones, no murmurs   Abdomen-soft, and nontender, no organomegaly or masses.  No gallops or rubs.  Ext- appear well perfused, no edema  Skin- warm and dry, no concerning lesions/rash noted  Neuro exam grossly nonfocal-cranial nerves intact, normal gait, movements.  no visualized rash          8/7/2024   Mini Cog   Clock Draw Score 2 Normal   3 Item Recall 3 objects recalled   Mini Cog Total Score 5          Prior to immunization administration, verified patients identity using patient s name " and date of birth. Please see Immunization Activity for additional information.     Screening Questionnaire for Adult Immunization    Are you sick today?   No   Do you have allergies to medications, food, a vaccine component or latex?   No   Have you ever had a serious reaction after receiving a vaccination?   No   Do you have a long-term health problem with heart, lung, kidney, or metabolic disease (e.g., diabetes), asthma, a blood disorder, no spleen, complement component deficiency, a cochlear implant, or a spinal fluid leak?  Are you on long-term aspirin therapy?   Yes   Do you have cancer, leukemia, HIV/AIDS, or any other immune system problem?   No   Do you have a parent, brother, or sister with an immune system problem?   No   In the past 3 months, have you taken medications that affect  your immune system, such as prednisone, other steroids, or anticancer drugs; drugs for the treatment of rheumatoid arthritis, Crohn s disease, or psoriasis; or have you had radiation treatments?   No   Have you had a seizure, or a brain or other nervous system problem?   No   During the past year, have you received a transfusion of blood or blood    products, or been given immune (gamma) globulin or antiviral drug?   No   For women: Are you pregnant or is there a chance you could become       pregnant during the next month?   No   Have you received any vaccinations in the past 4 weeks?   No     Immunization questionnaire was positive for at least one answer.  Notified .      Patient instructed to remain in clinic for 15 minutes afterwards, and to report any adverse reactions.     Screening performed by Charu Martinez MA on 8/7/2024 at 9:19 AM.         Signed Electronically by: Fede Charles MD

## 2024-08-07 NOTE — ASSESSMENT & PLAN NOTE
Blood pressure elevated today, but 132/73 this morning from home and has been consistently normal at home.  No changes.  Will check BMP next visit.  This was done about 10 months ago and it has been normal throughout so I do not think we need to repeat that today.

## 2024-11-16 DIAGNOSIS — I10 ESSENTIAL HYPERTENSION: ICD-10-CM

## 2024-11-18 RX ORDER — METOPROLOL SUCCINATE 50 MG/1
TABLET, EXTENDED RELEASE ORAL
Qty: 90 TABLET | Refills: 3 | Status: SHIPPED | OUTPATIENT
Start: 2024-11-18

## 2024-11-18 RX ORDER — AMLODIPINE AND BENAZEPRIL HYDROCHLORIDE 10; 20 MG/1; MG/1
CAPSULE ORAL
Qty: 90 CAPSULE | Refills: 3 | Status: SHIPPED | OUTPATIENT
Start: 2024-11-18

## 2025-07-08 ENCOUNTER — PATIENT OUTREACH (OUTPATIENT)
Dept: CARE COORDINATION | Facility: CLINIC | Age: 80
End: 2025-07-08
Payer: COMMERCIAL

## 2025-07-22 ENCOUNTER — PATIENT OUTREACH (OUTPATIENT)
Dept: CARE COORDINATION | Facility: CLINIC | Age: 80
End: 2025-07-22
Payer: COMMERCIAL

## 2025-08-29 SDOH — HEALTH STABILITY: PHYSICAL HEALTH: ON AVERAGE, HOW MANY MINUTES DO YOU ENGAGE IN EXERCISE AT THIS LEVEL?: 40 MIN

## 2025-08-29 SDOH — HEALTH STABILITY: PHYSICAL HEALTH: ON AVERAGE, HOW MANY DAYS PER WEEK DO YOU ENGAGE IN MODERATE TO STRENUOUS EXERCISE (LIKE A BRISK WALK)?: 5 DAYS

## 2025-09-02 ENCOUNTER — OFFICE VISIT (OUTPATIENT)
Dept: FAMILY MEDICINE | Facility: CLINIC | Age: 80
End: 2025-09-02
Payer: COMMERCIAL

## 2025-09-02 VITALS
DIASTOLIC BLOOD PRESSURE: 80 MMHG | RESPIRATION RATE: 16 BRPM | HEART RATE: 59 BPM | WEIGHT: 184 LBS | OXYGEN SATURATION: 96 % | TEMPERATURE: 97.2 F | SYSTOLIC BLOOD PRESSURE: 160 MMHG | BODY MASS INDEX: 23.61 KG/M2 | HEIGHT: 74 IN

## 2025-09-02 DIAGNOSIS — I10 ESSENTIAL HYPERTENSION: Primary | ICD-10-CM

## 2025-09-02 DIAGNOSIS — Z13.6 SCREENING FOR CARDIOVASCULAR CONDITION: ICD-10-CM

## 2025-09-02 DIAGNOSIS — Z87.891 HISTORY OF SMOKING: ICD-10-CM

## 2025-09-02 DIAGNOSIS — L70.0 COMEDONE: ICD-10-CM

## 2025-09-02 DIAGNOSIS — E78.5 HYPERLIPIDEMIA, UNSPECIFIED HYPERLIPIDEMIA TYPE: ICD-10-CM

## 2025-09-02 DIAGNOSIS — Z23 NEED FOR VACCINATION: ICD-10-CM

## 2025-09-02 DIAGNOSIS — Z00.00 HEALTHCARE MAINTENANCE: ICD-10-CM

## 2025-09-02 LAB
ANION GAP SERPL CALCULATED.3IONS-SCNC: 10 MMOL/L (ref 7–15)
BUN SERPL-MCNC: 13.2 MG/DL (ref 8–23)
CALCIUM SERPL-MCNC: 9.7 MG/DL (ref 8.8–10.4)
CHLORIDE SERPL-SCNC: 104 MMOL/L (ref 98–107)
CREAT SERPL-MCNC: 0.98 MG/DL (ref 0.67–1.17)
EGFRCR SERPLBLD CKD-EPI 2021: 78 ML/MIN/1.73M2
ERYTHROCYTE [DISTWIDTH] IN BLOOD BY AUTOMATED COUNT: 12.5 % (ref 10–15)
GLUCOSE SERPL-MCNC: 101 MG/DL (ref 70–99)
HCO3 SERPL-SCNC: 26 MMOL/L (ref 22–29)
HCT VFR BLD AUTO: 44.6 % (ref 40–53)
HGB BLD-MCNC: 15.4 G/DL (ref 13.3–17.7)
MCH RBC QN AUTO: 30.7 PG (ref 26.5–33)
MCHC RBC AUTO-ENTMCNC: 34.5 G/DL (ref 31.5–36.5)
MCV RBC AUTO: 88.8 FL (ref 78–100)
PLATELET # BLD AUTO: 180 10E3/UL (ref 150–450)
POTASSIUM SERPL-SCNC: 5.2 MMOL/L (ref 3.4–5.3)
RBC # BLD AUTO: 5.02 10E6/UL (ref 4.4–5.9)
SODIUM SERPL-SCNC: 140 MMOL/L (ref 135–145)
WBC # BLD AUTO: 7.41 10E3/UL (ref 4–11)

## 2025-09-02 PROCEDURE — 36415 COLL VENOUS BLD VENIPUNCTURE: CPT | Performed by: FAMILY MEDICINE

## 2025-09-02 PROCEDURE — 80048 BASIC METABOLIC PNL TOTAL CA: CPT | Performed by: FAMILY MEDICINE

## 2025-09-02 PROCEDURE — 85027 COMPLETE CBC AUTOMATED: CPT | Performed by: FAMILY MEDICINE
